# Patient Record
Sex: FEMALE | Race: WHITE
[De-identification: names, ages, dates, MRNs, and addresses within clinical notes are randomized per-mention and may not be internally consistent; named-entity substitution may affect disease eponyms.]

---

## 2017-12-20 ENCOUNTER — HOSPITAL ENCOUNTER (EMERGENCY)
Dept: HOSPITAL 88 - ER | Age: 82
Discharge: HOME | End: 2017-12-20
Payer: COMMERCIAL

## 2017-12-20 VITALS — WEIGHT: 120 LBS | BODY MASS INDEX: 20.49 KG/M2 | HEIGHT: 64 IN

## 2017-12-20 DIAGNOSIS — F03.90: ICD-10-CM

## 2017-12-20 DIAGNOSIS — M54.2: ICD-10-CM

## 2017-12-20 DIAGNOSIS — J15.9: ICD-10-CM

## 2017-12-20 DIAGNOSIS — R05: ICD-10-CM

## 2017-12-20 DIAGNOSIS — R06.09: Primary | ICD-10-CM

## 2017-12-20 DIAGNOSIS — S16.1XXA: ICD-10-CM

## 2017-12-20 LAB
ALBUMIN SERPL-MCNC: 3.3 G/DL (ref 3.5–5)
ALBUMIN/GLOB SERPL: 0.8 {RATIO} (ref 0.8–2)
ALP SERPL-CCNC: 65 IU/L (ref 40–150)
ALT SERPL-CCNC: 21 IU/L (ref 0–55)
ANION GAP SERPL CALC-SCNC: 10.4 MMOL/L (ref 8–16)
BASOPHILS # BLD AUTO: 0 10*3/UL (ref 0–0.1)
BASOPHILS NFR BLD AUTO: 0.1 % (ref 0–1)
BUN SERPL-MCNC: 15 MG/DL (ref 7–26)
BUN/CREAT SERPL: 21 (ref 6–25)
CALCIUM SERPL-MCNC: 9.6 MG/DL (ref 8.4–10.2)
CHLORIDE SERPL-SCNC: 100 MMOL/L (ref 98–107)
CK MB SERPL-MCNC: 1.7 NG/ML (ref 0–5)
CK SERPL-CCNC: 14 IU/L (ref 29–168)
CO2 SERPL-SCNC: 32 MMOL/L (ref 22–29)
DEPRECATED APTT PLAS QN: 33.3 SECONDS (ref 23.8–35.5)
DEPRECATED INR PLAS: 1.02
DEPRECATED NEUTROPHILS # BLD AUTO: 5.7 10*3/UL (ref 2.1–6.9)
EGFRCR SERPLBLD CKD-EPI 2021: > 60 ML/MIN (ref 60–?)
EOSINOPHIL # BLD AUTO: 0 10*3/UL (ref 0–0.4)
EOSINOPHIL NFR BLD AUTO: 0.4 % (ref 0–6)
ERYTHROCYTE [DISTWIDTH] IN CORD BLOOD: 31.8 % (ref 11.7–14.4)
GLOBULIN PLAS-MCNC: 4.3 G/DL (ref 2.3–3.5)
GLUCOSE SERPLBLD-MCNC: 113 MG/DL (ref 74–118)
HCT VFR BLD AUTO: 29.3 % (ref 34.2–44.1)
HGB BLD-MCNC: 9.6 G/DL (ref 12–16)
LYMPHOCYTES # BLD: 1.4 10*3/UL (ref 1–3.2)
LYMPHOCYTES NFR BLD AUTO: 16.7 % (ref 18–39.1)
MCH RBC QN AUTO: 31.7 PG (ref 28–32)
MCHC RBC AUTO-ENTMCNC: 32.8 G/DL (ref 31–35)
MCV RBC AUTO: 96.7 FL (ref 81–99)
MONOCYTES # BLD AUTO: 0.9 10*3/UL (ref 0.2–0.8)
MONOCYTES NFR BLD AUTO: 11.6 % (ref 4.4–11.3)
NEUTS SEG NFR BLD AUTO: 70.8 % (ref 38.7–80)
PLATELET # BLD AUTO: 299 X10E3/UL (ref 140–360)
POTASSIUM SERPL-SCNC: 4.4 MMOL/L (ref 3.5–5.1)
PROTHROMBIN TIME: 13.9 SECONDS (ref 11.9–14.5)
RBC # BLD AUTO: 3.03 X10E6/UL (ref 3.6–5.1)
SODIUM SERPL-SCNC: 138 MMOL/L (ref 136–145)
TROPONIN I SERPL DL<=0.01 NG/ML-MCNC: 0.02 NG/ML (ref 0–0.3)

## 2017-12-20 PROCEDURE — 85025 COMPLETE CBC W/AUTO DIFF WBC: CPT

## 2017-12-20 PROCEDURE — 85730 THROMBOPLASTIN TIME PARTIAL: CPT

## 2017-12-20 PROCEDURE — 80053 COMPREHEN METABOLIC PANEL: CPT

## 2017-12-20 PROCEDURE — 71010: CPT

## 2017-12-20 PROCEDURE — 87400 INFLUENZA A/B EACH AG IA: CPT

## 2017-12-20 PROCEDURE — 82553 CREATINE MB FRACTION: CPT

## 2017-12-20 PROCEDURE — 99284 EMERGENCY DEPT VISIT MOD MDM: CPT

## 2017-12-20 PROCEDURE — 85610 PROTHROMBIN TIME: CPT

## 2017-12-20 PROCEDURE — 84484 ASSAY OF TROPONIN QUANT: CPT

## 2017-12-20 PROCEDURE — 36415 COLL VENOUS BLD VENIPUNCTURE: CPT

## 2017-12-20 PROCEDURE — 87040 BLOOD CULTURE FOR BACTERIA: CPT

## 2017-12-20 PROCEDURE — 83880 ASSAY OF NATRIURETIC PEPTIDE: CPT

## 2017-12-20 PROCEDURE — 93005 ELECTROCARDIOGRAM TRACING: CPT

## 2017-12-20 PROCEDURE — 82550 ASSAY OF CK (CPK): CPT

## 2017-12-20 NOTE — DIAGNOSTIC IMAGING REPORT
PROCEDURE:CHEST SINGLE (PORTABLE)

TECHNIQUE:Portable AP chest

INDICATION:Cough with stiff neck.

COMPARISON:Patients [facility], , CHEST SINGLE (PORTABLE), 

6/12/2017, 15:32.

 

FINDINGS:

Bilateral interstitial opacities superimposed upon chronic interstitial 

lung disease and scar. Normal heart size. Persistent bilateral 

pulmonary artery enlargement. Grossly intact skeleton.

 

 

CONCLUSION:

Bilateral interstitial opacities suspicious for pneumonia superimposed 

upon emphysema, chronic interstitial lung disease and scar.

 

 

 

Dictated by:  Robert Jordan M.D. on 12/20/2017 at 10:39     

Electronically approved by:  Robert Jordan M.D. on 12/20/2017 at 

10:39

## 2017-12-28 ENCOUNTER — HOSPITAL ENCOUNTER (EMERGENCY)
Dept: HOSPITAL 88 - ER | Age: 82
Discharge: HOME | End: 2017-12-28
Payer: MEDICARE

## 2017-12-28 VITALS — HEIGHT: 64 IN | BODY MASS INDEX: 20.49 KG/M2 | WEIGHT: 120 LBS

## 2017-12-28 VITALS — DIASTOLIC BLOOD PRESSURE: 74 MMHG | SYSTOLIC BLOOD PRESSURE: 126 MMHG
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
## 2017-12-28 DIAGNOSIS — S20.212A: Primary | ICD-10-CM

## 2017-12-28 DIAGNOSIS — S40.021A: ICD-10-CM
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
## 2017-12-28 DIAGNOSIS — S70.02XA: ICD-10-CM

## 2017-12-28 DIAGNOSIS — S70.12XA: ICD-10-CM

## 2017-12-28 DIAGNOSIS — Z87.01: ICD-10-CM

## 2017-12-28 LAB
ALBUMIN SERPL-MCNC: 3.1 G/DL (ref 3.5–5)
ALBUMIN/GLOB SERPL: 0.7 {RATIO} (ref 0.8–2)
ALP SERPL-CCNC: 70 IU/L (ref 40–150)
ALT SERPL-CCNC: 28 IU/L (ref 0–55)
ANION GAP SERPL CALC-SCNC: 13.3 MMOL/L (ref 8–16)
BASOPHILS # BLD AUTO: 0 10*3/UL (ref 0–0.1)
BASOPHILS NFR BLD AUTO: 0.2 % (ref 0–1)
BUN SERPL-MCNC: 19 MG/DL (ref 7–26)
BUN/CREAT SERPL: 29 (ref 6–25)
CALCIUM SERPL-MCNC: 9.6 MG/DL (ref 8.4–10.2)
CHLORIDE SERPL-SCNC: 102 MMOL/L (ref 98–107)
CO2 SERPL-SCNC: 28 MMOL/L (ref 22–29)
DEPRECATED NEUTROPHILS # BLD AUTO: 7.8 10*3/UL (ref 2.1–6.9)
EGFRCR SERPLBLD CKD-EPI 2021: > 60 ML/MIN (ref 60–?)
EOSINOPHIL # BLD AUTO: 0 10*3/UL (ref 0–0.4)
EOSINOPHIL NFR BLD AUTO: 0.2 % (ref 0–6)
ERYTHROCYTE [DISTWIDTH] IN CORD BLOOD: (no result) % (ref 11.7–14.4)
GLOBULIN PLAS-MCNC: 4.3 G/DL (ref 2.3–3.5)
GLUCOSE SERPLBLD-MCNC: 111 MG/DL (ref 74–118)
HCT VFR BLD AUTO: 30.6 % (ref 34.2–44.1)
HGB BLD-MCNC: 9.9 G/DL (ref 12–16)
LYMPHOCYTES # BLD: 1.3 10*3/UL (ref 1–3.2)
LYMPHOCYTES NFR BLD AUTO: 12.8 % (ref 18–39.1)
MCH RBC QN AUTO: 31.1 PG (ref 28–32)
MCHC RBC AUTO-ENTMCNC: 32.4 G/DL (ref 31–35)
MCV RBC AUTO: 96.2 FL (ref 81–99)
MONOCYTES # BLD AUTO: 1.1 10*3/UL (ref 0.2–0.8)
MONOCYTES NFR BLD AUTO: 10.8 % (ref 4.4–11.3)
NEUTS SEG NFR BLD AUTO: 75.4 % (ref 38.7–80)
PLATELET # BLD AUTO: 278 X10E3/UL (ref 140–360)
POTASSIUM SERPL-SCNC: 4.3 MMOL/L (ref 3.5–5.1)
RBC # BLD AUTO: 3.18 X10E6/UL (ref 3.6–5.1)
SODIUM SERPL-SCNC: 139 MMOL/L (ref 136–145)

## 2017-12-28 PROCEDURE — 99283 EMERGENCY DEPT VISIT LOW MDM: CPT

## 2017-12-28 PROCEDURE — 72072 X-RAY EXAM THORAC SPINE 3VWS: CPT

## 2017-12-28 PROCEDURE — 71020: CPT

## 2017-12-28 PROCEDURE — 85025 COMPLETE CBC W/AUTO DIFF WBC: CPT

## 2017-12-28 PROCEDURE — 80053 COMPREHEN METABOLIC PANEL: CPT

## 2017-12-28 PROCEDURE — 36415 COLL VENOUS BLD VENIPUNCTURE: CPT

## 2017-12-28 NOTE — DIAGNOSTIC IMAGING REPORT
PROCEDURE:HIP LEFT 2-3 VW (+/- PELVIS)

 

COMPARISON:None.

 

INDICATIONS:FALL

 

FINDINGS:

Postoperative changes of left hip total arthroplasty with the 

acetabular and femoral stem components in proper position. No native 

bone or hardware fracture is identified. Minimal heterotopic 

ossification is present. Normal mineralization. No acute fracture or 

dislocation. Joint spaces are within normal limits.

The soft tissues are unremarkable. Atherosclerotic calcifications.

 

CONCLUSION: 

Left total hip arthroplasty. No acute fracture. 

 

Dictated by:  Manny Kim M.D. on 12/28/2017 at 9:33     

Electronically approved by:  Manny Kim M.D. on 12/28/2017 at 9:33

## 2017-12-28 NOTE — DIAGNOSTIC IMAGING REPORT
PROCEDURE:X-RAY RIGHT HUMERUS, TWO OR MORE VIEWS

 

COMPARISON:None.

 

INDICATIONS:FALL

 

FINDINGS:

There are no fractures, dislocations, lytic or blastic lesions. The 

bones are well-mineralized. The soft-tissues are unremarkable.

 

CONCLUSION:

No acute radiographic abnormality.

 

Dictated by:  Mnany Kim M.D. on 12/28/2017 at 9:37     

Electronically approved by:  aMnny Kim M.D. on 12/28/2017 at 9:37

## 2017-12-28 NOTE — DIAGNOSTIC IMAGING REPORT
PROCEDURE:

Frontal and lateral views of the chest.

 

COMPARISON: Portable chest 12/20/2017. Portable chest 6/12/2017

 

INDICATIONS:   FALL

     

FINDINGS:

Lines/tubes:  None.

 

Lungs: No parenchymal mass. Bilateral chronic parenchymal changes are 

stable. Bibasilar atelectasis.

 

Pleura:  There is no pleural effusion or pneumothorax.

 

Heart and mediastinum:  The heart and the mediastinum are normal. 

Atherosclerotic calcifications.

 

Bones:  No acute bony abnormality. Degenerative changes of the thoracic 

spine. Kyphoplasty changes.

 

IMPRESSION: 

 

No acute radiographic abnormality.

 

Dictated by:  Manny Kim M.D. on 12/28/2017 at 9:27     

Electronically approved by:  Manny Kim M.D. on 12/28/2017 at 9:27

## 2018-04-16 ENCOUNTER — HOSPITAL ENCOUNTER (EMERGENCY)
Dept: HOSPITAL 88 - ER | Age: 83
Discharge: HOME | End: 2018-04-16
Payer: MEDICARE

## 2018-04-16 VITALS — BODY MASS INDEX: 20.49 KG/M2 | HEIGHT: 64 IN | WEIGHT: 120 LBS

## 2018-04-16 VITALS — SYSTOLIC BLOOD PRESSURE: 116 MMHG | DIASTOLIC BLOOD PRESSURE: 80 MMHG

## 2018-04-16 DIAGNOSIS — M25.552: ICD-10-CM

## 2018-04-16 DIAGNOSIS — F41.1: ICD-10-CM

## 2018-04-16 DIAGNOSIS — S93.491A: Primary | ICD-10-CM

## 2018-04-16 DIAGNOSIS — T78.40XA: ICD-10-CM

## 2018-04-16 LAB
ALBUMIN SERPL-MCNC: 3.2 G/DL (ref 3.5–5)
ALBUMIN/GLOB SERPL: 0.7 {RATIO} (ref 0.8–2)
ALP SERPL-CCNC: 63 IU/L (ref 40–150)
ALT SERPL-CCNC: 23 IU/L (ref 0–55)
ANION GAP SERPL CALC-SCNC: 11.9 MMOL/L (ref 8–16)
ANISOCYTOSIS BLD QL SMEAR: (no result)
BACTERIA URNS QL MICRO: (no result) /HPF
BASOPHILS # BLD AUTO: 0 10*3/UL (ref 0–0.1)
BASOPHILS NFR BLD AUTO: 0.1 % (ref 0–1)
BILIRUB UR QL: (no result)
BUN SERPL-MCNC: 16 MG/DL (ref 7–26)
BUN/CREAT SERPL: 24 (ref 6–25)
CALCIUM SERPL-MCNC: 9.6 MG/DL (ref 8.4–10.2)
CHLORIDE SERPL-SCNC: 94 MMOL/L (ref 98–107)
CLARITY UR: CLEAR
CO2 SERPL-SCNC: 31 MMOL/L (ref 22–29)
COLOR UR: (no result)
DEPRECATED NEUTROPHILS # BLD AUTO: 8.3 10*3/UL (ref 2.1–6.9)
DEPRECATED RBC URNS MANUAL-ACNC: (no result) /HPF (ref 0–5)
EGFRCR SERPLBLD CKD-EPI 2021: > 60 ML/MIN (ref 60–?)
EOSINOPHIL # BLD AUTO: 0 10*3/UL (ref 0–0.4)
EOSINOPHIL NFR BLD AUTO: 0.1 % (ref 0–6)
EPI CELLS URNS QL MICRO: (no result) /LPF
ERYTHROCYTE [DISTWIDTH] IN CORD BLOOD: (no result) % (ref 11.7–14.4)
GLOBULIN PLAS-MCNC: 4.4 G/DL (ref 2.3–3.5)
GLUCOSE SERPLBLD-MCNC: 115 MG/DL (ref 74–118)
HCT VFR BLD AUTO: 29.6 % (ref 34.2–44.1)
HGB BLD-MCNC: 10 G/DL (ref 12–16)
HOWELL-JOLLY BOD BLD QL SMEAR: (no result)
HYALINE CASTS #/AREA URNS LPF: (no result) /[LPF] (ref 0–1)
HYPOCHROMIA BLD QL SMEAR: SLIGHT
KETONES UR QL STRIP.AUTO: (no result)
LEUKOCYTE ESTERASE UR QL STRIP.AUTO: NEGATIVE
LYMPHOCYTES # BLD: 0.9 10*3/UL (ref 1–3.2)
LYMPHOCYTES NFR BLD AUTO: 8.6 % (ref 18–39.1)
MCH RBC QN AUTO: 32.5 PG (ref 28–32)
MCHC RBC AUTO-ENTMCNC: 33.8 G/DL (ref 31–35)
MCV RBC AUTO: 96.1 FL (ref 81–99)
MONOCYTES # BLD AUTO: 0.7 10*3/UL (ref 0.2–0.8)
MONOCYTES NFR BLD AUTO: 7.2 % (ref 4.4–11.3)
NEUTS SEG NFR BLD AUTO: 83.5 % (ref 38.7–80)
NITRITE UR QL STRIP.AUTO: NEGATIVE
PLAT MORPH BLD: NORMAL
PLATELET # BLD AUTO: 265 X10E3/UL (ref 140–360)
PLATELET # BLD EST: ADEQUATE 10*3/UL
POIKILOCYTOSIS BLD QL SMEAR: SLIGHT
POTASSIUM SERPL-SCNC: 3.9 MMOL/L (ref 3.5–5.1)
PROT UR QL STRIP.AUTO: (no result)
RBC # BLD AUTO: 3.08 X10E6/UL (ref 3.6–5.1)
RBC MORPH BLD: (no result)
SODIUM SERPL-SCNC: 133 MMOL/L (ref 136–145)
SP GR UR STRIP: 1.02 (ref 1.01–1.02)
STOMATOCYTES BLD QL SMEAR: SLIGHT
TARGETS BLD QL SMEAR: (no result)
UROBILINOGEN UR STRIP-MCNC: 1 MG/DL (ref 0.2–1)
WBC #/AREA URNS HPF: (no result) /HPF (ref 0–5)

## 2018-04-16 PROCEDURE — 72170 X-RAY EXAM OF PELVIS: CPT

## 2018-04-16 PROCEDURE — 84550 ASSAY OF BLOOD/URIC ACID: CPT

## 2018-04-16 PROCEDURE — 94640 AIRWAY INHALATION TREATMENT: CPT

## 2018-04-16 PROCEDURE — 71045 X-RAY EXAM CHEST 1 VIEW: CPT

## 2018-04-16 PROCEDURE — 99284 EMERGENCY DEPT VISIT MOD MDM: CPT

## 2018-04-16 PROCEDURE — 80053 COMPREHEN METABOLIC PANEL: CPT

## 2018-04-16 PROCEDURE — 81001 URINALYSIS AUTO W/SCOPE: CPT

## 2018-04-16 PROCEDURE — 85025 COMPLETE CBC W/AUTO DIFF WBC: CPT

## 2018-04-16 PROCEDURE — 36415 COLL VENOUS BLD VENIPUNCTURE: CPT

## 2018-04-16 PROCEDURE — 93005 ELECTROCARDIOGRAM TRACING: CPT

## 2018-04-16 NOTE — DIAGNOSTIC IMAGING REPORT
PROCEDURE: CHEST SINGLE (PORTABLE)

COMPARISON: 12/28/2017.

INDICATIONS: UNABLE TO STAND/WALK

 

FINDINGS:



The lungs remain well-inflated. No new consolidation, pleural effusion, 

or pneumothorax. Coarse pulmonary interstitial prominence is unchanged 

compared to 12/28/2017.

 

Stable cardiomediastinal contour with tortuosity of the thoracic aorta. 

Normal heart size.

 

Vertebroplasty cement within an upper thoracic vertebral body. No acute 

osseous abnormality. Healed fractures of the posterior left sixth and 

seventh ribs.

 

CONCLUSION:

No acute cardiopulmonary abnormality.

 

Stable presumed age-related fibrotic changes of the lungs. 

 

Dictated by:  Kota Colon M.D. on 4/16/2018 at 9:14     

Electronically approved by:  Kota Colon M.D. on 4/16/2018 at 9:14

## 2018-04-16 NOTE — XMS REPORT
Patient Summary Document

 Created on: 2018



ELISEO DUNLAP

External Reference #: [id number]

: 1931

Sex: Female



Demographics







 [place] [address]

 

 Home Phone  [phone]

 

 Preferred Language  Unknown

 

 Marital Status  Unknown

 

 Mandaen Affiliation  Unknown

 

 Race  Unknown

 

 Additional Race(s)  

 

 Ethnic Group  Unknown





Author







 Author  [facility]

 

 Address  Unknown

 

 Phone  Unavailable







Care Team Providers







 Care Team Member Name  Role  Phone

 

 CRYSTAL RENAE  Unavailable  Unavailable

 

 KASEY MANUEL  Unavailable  Unavailable







Problems

This patient has no known problems.



Allergies, Adverse Reactions, Alerts

This patient has no known allergies or adverse reactions.



Medications

This patient has no known medications.



Results







 Test Description  Test Time  Test Comments  Text Results  Atomic Results  
Result Comments









 CHEST 2 VIEWS            [place] [address]      Patient Name: ELISEO DUNLAP   
MR #: [id number]    : 1931 Age/Sex: 86/F  Acct #: [id number] Req #: 
[id number]  Adm Physician:     Ordered by: CRYSTAL RENAE MD  Report #: [id number]
[id number]   Location: ER  Room/Bed:     _____________________________________________
______________________________________________________    Procedure: [id number] 
DX/CHEST 2 VIEWS  Exam Date: 17                            Exam Time: 
0850       REPORT STATUS: Signed    PROCEDURE:   Frontal and lateral views of 
the chest.       COMPARISON: Portable chest 2017. Portable chest 2017       INDICATIONS:   FALL           FINDINGS:   Lines/tubes:  None.       
Lungs: No parenchymal mass. Bilateral chronic parenchymal changes are    
stable. Bibasilar atelectasis.       Pleura:  There is no pleural effusion or 
pneumothorax.       Heart and mediastinum:  The heart and the mediastinum are 
normal.    Atherosclerotic calcifications.       Bones:  No acute bony 
abnormality. Degenerative changes of the thoracic    spine. Kyphoplasty 
changes.       IMPRESSION:        No acute radiographic abnormality.       
Dictated by:  Dusty Palomino M.D. on 2017 at 9:27        Electronically 
approved by:  Dusty Palomino M.D. on 2017 at 9:27                Dictated By
: DUSTY PALOMINO MD  Electronically Signed By: DUSTY PALOMINO MD on 17  
Transcribed By: ALLEN on 17       COPY TO:   CRYSTAL RENAE MD        
   

 

 HIP LEFT 2-3 VW (+/- PELVIS)            [place] [address]      Patient Name: 
ELISEO DUNLAP   MR #: [id number]    : 1931 Age/Sex: 86/F  Acct #: 
[id number] Req #: [id number]  Adm Physician:     Ordered by: CRYSTAL RENAE MD
  Report #: [id number]   Location: ER  Room/Bed:     ____________________________
_______________________________________________________________________    
Procedure: [id number] DX/HIP LEFT 2-3 VW (+/- PELVIS)  Exam Date:               
              Exam Time:        REPORT STATUS: Signed    PROCEDURE:   HIP LEFT 2
-3 VW (+/- PELVIS)       COMPARISON:   None.       INDICATIONS:   FALL       
FINDINGS:      Postoperative changes of left hip total arthroplasty with the    
acetabular and femoral stem components in proper position. No native    bone or 
hardware fracture is identified. Minimal heterotopic    ossification is 
present. Normal mineralization. No acute fracture or    dislocation. Joint 
spaces are within normal limits.   The soft tissues are unremarkable. 
Atherosclerotic calcifications.       CONCLUSION:       Left total hip 
arthroplasty. No acute fracture.        Dictated by:  Dusty Palomino M.D. on  at 9:33        Electronically approved by:  Dusty Palomino M.D. on 2017 at 9:33                Dictated By: DUSTY PALOMINO MD  Electronically Signed 
By: DUSTY PALOMINO MD on 17  Transcribed By: ALLEN on 17    
   COPY TO:   CRYSTAL RENAE MD           

 

 HUMERUS RIGHT 2+VIEWS            [place] [address]      Patient Name: ELISEO DUNLAP   MR #: [id number]    : 1931 Age/Sex: 86/F  Acct #: [id number] Req 
#: [id number]  Adm Physician:     Ordered by: CRYSTAL RNEAE MD  Report #: [id number]
[id number]   Location: ER  Room/Bed:     _____________________________________________
______________________________________________________    Procedure: [id number] 
DX/HUMERUS RIGHT 2+VIEWS  Exam Date: 17                            Exam 
Time: 0850       REPORT STATUS: Signed    PROCEDURE:   X-RAY RIGHT HUMERUS, TWO 
OR MORE VIEWS       COMPARISON:   None.       INDICATIONS:   FALL       FINDINGS
:      There are no fractures, dislocations, lytic or blastic lesions. The    
bones are well-mineralized. The soft-tissues are unremarkable.          
CONCLUSION:      No acute radiographic abnormality.       Dictated by:  Dusty Palomino M.D. on 2017 at 9:37        Electronically approved by:  Dusty Palomino M.D. on 2017 at 9:37                Dictated By: DUSTY PALOMINO MD  
Electronically Signed By: DUSTY PALOMINO MD on 17  Transcribed By: 
ALLEN on 17       COPY TO:   CRYSTAL RENAE MD           

 

 THORACIC SPINE AP LA            [place] [address]      Patient Name: ELISEO DUNLAP   MR #: [id number]    : 1931 Age/Sex: 86/F  Acct #: [id number] Req 
#: [id number]  Adm Physician:     Ordered by: CRYSTAL RENAE MD  Report #: [id number]
[id number]   Location: ER  Room/Bed:     _____________________________________________
______________________________________________________    Procedure: [id number] 
DX/THORACIC SPINE AP LA  Exam Date: 17                            Exam 
Time: 0850       REPORT STATUS: Signed    PROCEDURE:   X-RAY THORACIC SPINE, 
TWO VIEWS       COMPARISON:   None.       INDICATIONS:   FALL       FINDINGS:  
    The vertebral body and disc space heights are well-maintained. There is    
good spinal alignment. There are no acute displaced fractures, lytic or    
blastic lesions. Degenerative changes evidenced by anterior    osteophytosis at 
multiple levels. Kyphoplasty changes.       CONCLUSION:      No acute 
radiographic abnormality.       Dictated by:  Dusty Palomino M.D. on 2017 
at 9:38        Electronically approved by:  Dusty Palomino M.D. on 2017 at 9
:38                Dictated By: DUSTY PALOMINO MD  Electronically Signed By: DUSTY PALOMINO MD on 17  Transcribed By: ALLEN on 17       COPY TO
:   CRYSTAL RENAE MD           

 

 CHEST SINGLE ([facility])            [place] [address]      Patient Name: ELISEO DUNLAP   MR #: [id number]    : 1931 Age/Sex: 86/F  Acct #: 
[id number] Req #: [id number]  Adm Physician:     Ordered by: KASEY MANUEL MD  Report #: [id number]   Location: ER  Room/Bed:     __________________________
_________________________________________________________________________    
Procedure: [id number] DX/CHEST SINGLE (PORTABLE)  Exam Date: 17           
                 Exam Time: 1010       REPORT STATUS: Signed    PROCEDURE:   
CHEST SINGLE (PORTABLE)   TECHNIQUE:   Portable AP chest   INDICATION:   Cough 
with stiff neck.   COMPARISON:   Patients [facility], DX, CHEST SINGLE (
PORTABLE),    2017, 15:32.       FINDINGS:   Bilateral interstitial 
opacities superimposed upon chronic interstitial    lung disease and scar. 
Normal heart size. Persistent bilateral    pulmonary artery enlargement. 
Grossly intact skeleton.           CONCLUSION:   Bilateral interstitial 
opacities suspicious for pneumonia superimposed    upon emphysema, chronic 
interstitial lung disease and scar.               Dictated by:  Camilla Jordan M.D. on 2017 at 10:39        Electronically approved by:  Camilla Jordan M.D. on 2017 at    10:39                Dictated By: CAMILLA JORDAN MD  Electronically Signed By: CAMILLA JORDAN MD on 17 1039  
Transcribed By: ALLEN on 17 1039       COPY TO:   KASEY MANUEL MD

## 2018-04-16 NOTE — DIAGNOSTIC IMAGING REPORT
PROCEDURE:X-RAY PELVIS, AP VIEW

 

COMPARISON:12/28/2017.

 

INDICATIONS:Left HIP PAIN

 

FINDINGS:

Post surgical changes related to total left hip replacement with intact 

acetabular cup and femoral stem components. No periprosthetic displaced 

fracture.

 

Degenerative changes of the partially visualized lower lumbar spine and 

right hip. Sacroiliac joints are intact. The sacral foramina appear 

intact superiorly.

 



 

CONCLUSION:

No acute osseous abnormalities. Intact surgical hardware related to 

left hip arthroplasty. 

Dictated by:  Kota Colon M.D. on 4/16/2018 at 12:03     

Electronically approved by:  Koat Colon M.D. on 4/16/2018 at 12:03

## 2018-07-30 LAB
BASOPHILS # BLD AUTO: 0 10*3/UL (ref 0–0.1)
BASOPHILS NFR BLD AUTO: 0.2 % (ref 0–1)
DEPRECATED NEUTROPHILS # BLD AUTO: 3.6 10*3/UL (ref 2.1–6.9)
EOSINOPHIL # BLD AUTO: 0.1 10*3/UL (ref 0–0.4)
EOSINOPHIL NFR BLD AUTO: 0.9 % (ref 0–6)
ERYTHROCYTE [DISTWIDTH] IN CORD BLOOD: 31.9 % (ref 11.7–14.4)
HCT VFR BLD AUTO: 25.2 % (ref 34.2–44.1)
HGB BLD-MCNC: 8.5 G/DL (ref 12–16)
LYMPHOCYTES # BLD: 1.4 10*3/UL (ref 1–3.2)
LYMPHOCYTES NFR BLD AUTO: 25.7 % (ref 18–39.1)
MCH RBC QN AUTO: 32.1 PG (ref 28–32)
MCHC RBC AUTO-ENTMCNC: 33.7 G/DL (ref 31–35)
MCV RBC AUTO: 95.1 FL (ref 81–99)
MONOCYTES # BLD AUTO: 0.3 10*3/UL (ref 0.2–0.8)
MONOCYTES NFR BLD AUTO: 5.8 % (ref 4.4–11.3)
NEUTS SEG NFR BLD AUTO: 67 % (ref 38.7–80)
PLATELET # BLD AUTO: 249 X10E3/UL (ref 140–360)
RBC # BLD AUTO: 2.65 X10E6/UL (ref 3.6–5.1)

## 2018-08-01 ENCOUNTER — HOSPITAL ENCOUNTER (OUTPATIENT)
Dept: HOSPITAL 88 - OR | Age: 83
Discharge: HOME | End: 2018-08-01
Attending: INTERNAL MEDICINE
Payer: MEDICARE

## 2018-08-01 DIAGNOSIS — Z01.812: ICD-10-CM

## 2018-08-01 DIAGNOSIS — K29.70: ICD-10-CM

## 2018-08-01 DIAGNOSIS — I44.4: ICD-10-CM

## 2018-08-01 DIAGNOSIS — K44.9: ICD-10-CM

## 2018-08-01 DIAGNOSIS — K57.30: ICD-10-CM

## 2018-08-01 DIAGNOSIS — K22.70: Primary | ICD-10-CM

## 2018-08-01 DIAGNOSIS — I45.10: ICD-10-CM

## 2018-08-01 DIAGNOSIS — Z80.0: ICD-10-CM

## 2018-08-01 DIAGNOSIS — K21.0: ICD-10-CM

## 2018-08-01 DIAGNOSIS — R63.4: ICD-10-CM

## 2018-08-01 DIAGNOSIS — M81.0: ICD-10-CM

## 2018-08-01 DIAGNOSIS — Z01.810: ICD-10-CM

## 2018-08-01 DIAGNOSIS — Z96.649: ICD-10-CM

## 2018-08-01 DIAGNOSIS — R00.1: ICD-10-CM

## 2018-08-01 PROCEDURE — 85025 COMPLETE CBC W/AUTO DIFF WBC: CPT

## 2018-08-01 PROCEDURE — 36415 COLL VENOUS BLD VENIPUNCTURE: CPT

## 2018-08-01 PROCEDURE — 88305 TISSUE EXAM BY PATHOLOGIST: CPT

## 2018-08-01 PROCEDURE — 43239 EGD BIOPSY SINGLE/MULTIPLE: CPT

## 2018-08-01 PROCEDURE — 88312 SPECIAL STAINS GROUP 1: CPT

## 2018-08-01 PROCEDURE — 93005 ELECTROCARDIOGRAM TRACING: CPT

## 2018-11-22 ENCOUNTER — HOSPITAL ENCOUNTER (INPATIENT)
Dept: HOSPITAL 88 - FSED | Age: 83
LOS: 11 days | Discharge: HOME | DRG: 853 | End: 2018-12-03
Attending: INTERNAL MEDICINE | Admitting: INTERNAL MEDICINE
Payer: MEDICARE

## 2018-11-22 VITALS — SYSTOLIC BLOOD PRESSURE: 108 MMHG | DIASTOLIC BLOOD PRESSURE: 58 MMHG

## 2018-11-22 VITALS — DIASTOLIC BLOOD PRESSURE: 58 MMHG | SYSTOLIC BLOOD PRESSURE: 108 MMHG

## 2018-11-22 VITALS — HEIGHT: 64 IN | WEIGHT: 119.56 LBS | BODY MASS INDEX: 20.41 KG/M2

## 2018-11-22 DIAGNOSIS — K29.70: ICD-10-CM

## 2018-11-22 DIAGNOSIS — R13.19: ICD-10-CM

## 2018-11-22 DIAGNOSIS — Z88.5: ICD-10-CM

## 2018-11-22 DIAGNOSIS — M10.9: ICD-10-CM

## 2018-11-22 DIAGNOSIS — I95.9: ICD-10-CM

## 2018-11-22 DIAGNOSIS — J18.9: ICD-10-CM

## 2018-11-22 DIAGNOSIS — A41.9: Primary | ICD-10-CM

## 2018-11-22 DIAGNOSIS — K21.0: ICD-10-CM

## 2018-11-22 DIAGNOSIS — Z88.8: ICD-10-CM

## 2018-11-22 DIAGNOSIS — D64.9: ICD-10-CM

## 2018-11-22 DIAGNOSIS — I10: ICD-10-CM

## 2018-11-22 DIAGNOSIS — K44.9: ICD-10-CM

## 2018-11-22 DIAGNOSIS — F41.9: ICD-10-CM

## 2018-11-22 LAB
CK MB SERPL-MCNC: 2.6 NG/ML (ref 0–5)
CK SERPL-CCNC: 122 IU/L (ref 29–168)

## 2018-11-22 PROCEDURE — 87335 E COLI 0157 AG IA: CPT

## 2018-11-22 PROCEDURE — 88312 SPECIAL STAINS GROUP 1: CPT

## 2018-11-22 PROCEDURE — 86850 RBC ANTIBODY SCREEN: CPT

## 2018-11-22 PROCEDURE — 71046 X-RAY EXAM CHEST 2 VIEWS: CPT

## 2018-11-22 PROCEDURE — 87206 SMEAR FLUORESCENT/ACID STAI: CPT

## 2018-11-22 PROCEDURE — 82553 CREATINE MB FRACTION: CPT

## 2018-11-22 PROCEDURE — 87070 CULTURE OTHR SPECIMN AEROBIC: CPT

## 2018-11-22 PROCEDURE — 93005 ELECTROCARDIOGRAM TRACING: CPT

## 2018-11-22 PROCEDURE — 83880 ASSAY OF NATRIURETIC PEPTIDE: CPT

## 2018-11-22 PROCEDURE — 31625 BRONCHOSCOPY W/BIOPSY(S): CPT

## 2018-11-22 PROCEDURE — 87102 FUNGUS ISOLATION CULTURE: CPT

## 2018-11-22 PROCEDURE — 97139 UNLISTED THERAPEUTIC PX: CPT

## 2018-11-22 PROCEDURE — 87040 BLOOD CULTURE FOR BACTERIA: CPT

## 2018-11-22 PROCEDURE — 80076 HEPATIC FUNCTION PANEL: CPT

## 2018-11-22 PROCEDURE — 87205 SMEAR GRAM STAIN: CPT

## 2018-11-22 PROCEDURE — 82550 ASSAY OF CK (CPK): CPT

## 2018-11-22 PROCEDURE — 94640 AIRWAY INHALATION TREATMENT: CPT

## 2018-11-22 PROCEDURE — 71250 CT THORAX DX C-: CPT

## 2018-11-22 PROCEDURE — 80048 BASIC METABOLIC PNL TOTAL CA: CPT

## 2018-11-22 PROCEDURE — 81003 URINALYSIS AUTO W/O SCOPE: CPT

## 2018-11-22 PROCEDURE — 88305 TISSUE EXAM BY PATHOLOGIST: CPT

## 2018-11-22 PROCEDURE — 87400 INFLUENZA A/B EACH AG IA: CPT

## 2018-11-22 PROCEDURE — 74230 X-RAY XM SWLNG FUNCJ C+: CPT

## 2018-11-22 PROCEDURE — 36415 COLL VENOUS BLD VENIPUNCTURE: CPT

## 2018-11-22 PROCEDURE — 94668 MNPJ CHEST WALL SBSQ: CPT

## 2018-11-22 PROCEDURE — 74220 X-RAY XM ESOPHAGUS 1CNTRST: CPT

## 2018-11-22 PROCEDURE — 99284 EMERGENCY DEPT VISIT MOD MDM: CPT

## 2018-11-22 PROCEDURE — 31623 DX BRONCHOSCOPE/BRUSH: CPT

## 2018-11-22 PROCEDURE — 94667 MNPJ CHEST WALL 1ST: CPT

## 2018-11-22 PROCEDURE — 86920 COMPATIBILITY TEST SPIN: CPT

## 2018-11-22 PROCEDURE — 83605 ASSAY OF LACTIC ACID: CPT

## 2018-11-22 PROCEDURE — 71045 X-RAY EXAM CHEST 1 VIEW: CPT

## 2018-11-22 PROCEDURE — 43239 EGD BIOPSY SINGLE/MULTIPLE: CPT

## 2018-11-22 PROCEDURE — 85025 COMPLETE CBC W/AUTO DIFF WBC: CPT

## 2018-11-22 PROCEDURE — 70490 CT SOFT TISSUE NECK W/O DYE: CPT

## 2018-11-22 PROCEDURE — 83735 ASSAY OF MAGNESIUM: CPT

## 2018-11-22 PROCEDURE — 87116 MYCOBACTERIA CULTURE: CPT

## 2018-11-22 PROCEDURE — 84484 ASSAY OF TROPONIN QUANT: CPT

## 2018-11-22 PROCEDURE — 76001: CPT

## 2018-11-22 PROCEDURE — 86900 BLOOD TYPING SEROLOGIC ABO: CPT

## 2018-11-22 PROCEDURE — 80053 COMPREHEN METABOLIC PANEL: CPT

## 2018-11-22 RX ADMIN — LEVOFLOXACIN SCH MG: 5 INJECTION, SOLUTION INTRAVENOUS at 21:00

## 2018-11-22 RX ADMIN — Medication SCH ML: at 23:40

## 2018-11-22 NOTE — DIAGNOSTIC IMAGING REPORT
EXAMINATION:  CXR 2 VIEW - [facility]    



INDICATION: Cough, cold, fever  

^[id number]

^1950



COMPARISON:  Chest x-ray 12/28/2018 and 4/16/2018

     

FINDINGS:  PA and lateral views



TUBES and LINES:  None.



LUNGS:  Lungs are well inflated.  Patchy bibasilar opacities which are new from

4/16/2018.   There is no evidence of pulmonary edema.



PLEURA:  No pleural effusion or pneumothorax.



HEART AND MEDIASTINUM:  The cardiomediastinal silhouette is unremarkable.    



BONES AND SOFT TISSUES:  No acute osseous lesion. Kyphoplasty cement in T7.

Soft tissues are unremarkable.



UPPER ABDOMEN: No free air under the diaphragm.    



IMPRESSION: 

Bilateral airspace opacities concerning for multifocal pneumonia in the

appropriate clinical setting. Recommend follow-up chest x-ray in 8-12 weeks to

document resolution.





Signed by: DR. Larry Sanchez MD on 11/22/2018 8:33 PM

## 2018-11-23 VITALS — SYSTOLIC BLOOD PRESSURE: 94 MMHG | DIASTOLIC BLOOD PRESSURE: 47 MMHG

## 2018-11-23 VITALS — SYSTOLIC BLOOD PRESSURE: 96 MMHG | DIASTOLIC BLOOD PRESSURE: 51 MMHG

## 2018-11-23 VITALS — SYSTOLIC BLOOD PRESSURE: 90 MMHG | DIASTOLIC BLOOD PRESSURE: 52 MMHG

## 2018-11-23 VITALS — SYSTOLIC BLOOD PRESSURE: 96 MMHG | DIASTOLIC BLOOD PRESSURE: 20 MMHG

## 2018-11-23 VITALS — DIASTOLIC BLOOD PRESSURE: 63 MMHG | SYSTOLIC BLOOD PRESSURE: 122 MMHG

## 2018-11-23 VITALS — DIASTOLIC BLOOD PRESSURE: 57 MMHG | SYSTOLIC BLOOD PRESSURE: 103 MMHG

## 2018-11-23 VITALS — DIASTOLIC BLOOD PRESSURE: 50 MMHG | SYSTOLIC BLOOD PRESSURE: 96 MMHG

## 2018-11-23 LAB
ANION GAP SERPL CALC-SCNC: 13 MMOL/L (ref 8–16)
BASOPHILS # BLD AUTO: 0 10*3/UL (ref 0–0.1)
BASOPHILS NFR BLD AUTO: 0.2 % (ref 0–1)
BUN SERPL-MCNC: 17 MG/DL (ref 7–26)
BUN/CREAT SERPL: 27 (ref 6–25)
CALCIUM SERPL-MCNC: 8.5 MG/DL (ref 8.4–10.2)
CHLORIDE SERPL-SCNC: 96 MMOL/L (ref 98–107)
CK MB SERPL-MCNC: 2.4 NG/ML (ref 0–5)
CK MB SERPL-MCNC: 2.7 NG/ML (ref 0–5)
CK SERPL-CCNC: 193 IU/L (ref 29–168)
CK SERPL-CCNC: 220 IU/L (ref 29–168)
CO2 SERPL-SCNC: 28 MMOL/L (ref 22–29)
DEPRECATED NEUTROPHILS # BLD AUTO: 3.7 10*3/UL (ref 2.1–6.9)
EGFRCR SERPLBLD CKD-EPI 2021: > 60 ML/MIN (ref 60–?)
EOSINOPHIL # BLD AUTO: 0 10*3/UL (ref 0–0.4)
EOSINOPHIL NFR BLD AUTO: 0.2 % (ref 0–6)
EOSINOPHIL NFR BLD MANUAL: 1 % (ref 0–7)
ERYTHROCYTE [DISTWIDTH] IN CORD BLOOD: 32.3 % (ref 11.7–14.4)
GLUCOSE SERPLBLD-MCNC: 105 MG/DL (ref 74–118)
HCT VFR BLD AUTO: 20 % (ref 34.2–44.1)
HGB BLD-MCNC: 6.7 G/DL (ref 12–16)
LYMPHOCYTES # BLD: 0.6 10*3/UL (ref 1–3.2)
LYMPHOCYTES NFR BLD AUTO: 12.1 % (ref 18–39.1)
LYMPHOCYTES NFR BLD MANUAL: 18 % (ref 19–48)
MCH RBC QN AUTO: 31.9 PG (ref 28–32)
MCHC RBC AUTO-ENTMCNC: 33.5 G/DL (ref 31–35)
MCV RBC AUTO: 95.2 FL (ref 81–99)
MONOCYTES # BLD AUTO: 0.4 10*3/UL (ref 0.2–0.8)
MONOCYTES NFR BLD AUTO: 7.5 % (ref 4.4–11.3)
MONOCYTES NFR BLD MANUAL: 6 % (ref 3.4–9)
NEUTS BAND NFR BLD MANUAL: 9 %
NEUTS SEG NFR BLD AUTO: 79.6 % (ref 38.7–80)
NEUTS SEG NFR BLD MANUAL: 66 % (ref 40–74)
PLAT MORPH BLD: NORMAL
PLATELET # BLD AUTO: 176 X10E3/UL (ref 140–360)
PLATELET # BLD EST: ADEQUATE 10*3/UL
POTASSIUM SERPL-SCNC: 4 MMOL/L (ref 3.5–5.1)
RBC # BLD AUTO: 2.1 X10E6/UL (ref 3.6–5.1)
RBC MORPH BLD: NORMAL
SODIUM SERPL-SCNC: 133 MMOL/L (ref 136–145)

## 2018-11-23 PROCEDURE — 30233N1 TRANSFUSION OF NONAUTOLOGOUS RED BLOOD CELLS INTO PERIPHERAL VEIN, PERCUTANEOUS APPROACH: ICD-10-PCS | Performed by: INTERNAL MEDICINE

## 2018-11-23 RX ADMIN — Medication SCH ML: at 15:21

## 2018-11-23 RX ADMIN — TAZOBACTAM SODIUM AND PIPERACILLIN SODIUM SCH MLS/HR: 375; 3 INJECTION, SOLUTION INTRAVENOUS at 07:52

## 2018-11-23 RX ADMIN — Medication SCH ML: at 03:10

## 2018-11-23 RX ADMIN — TAZOBACTAM SODIUM AND PIPERACILLIN SODIUM SCH MLS/HR: 375; 3 INJECTION, SOLUTION INTRAVENOUS at 23:58

## 2018-11-23 RX ADMIN — Medication SCH ML: at 07:50

## 2018-11-23 RX ADMIN — LEVOFLOXACIN SCH MG: 5 INJECTION, SOLUTION INTRAVENOUS at 21:54

## 2018-11-23 RX ADMIN — TAZOBACTAM SODIUM AND PIPERACILLIN SODIUM SCH MLS/HR: 375; 3 INJECTION, SOLUTION INTRAVENOUS at 16:22

## 2018-11-23 RX ADMIN — GUAIFENESIN PRN MG: 100 SOLUTION ORAL at 15:08

## 2018-11-23 RX ADMIN — PANTOPRAZOLE SODIUM SCH MG: 40 TABLET, DELAYED RELEASE ORAL at 08:30

## 2018-11-23 RX ADMIN — Medication PRN MG: at 17:38

## 2018-11-23 RX ADMIN — Medication SCH ML: at 12:56

## 2018-11-23 RX ADMIN — Medication SCH ML: at 19:30

## 2018-11-23 RX ADMIN — GUAIFENESIN PRN MG: 100 SOLUTION ORAL at 08:30

## 2018-11-23 NOTE — DIAGNOSTIC IMAGING REPORT
PROCEDURE:

BARIUM SWALLOW was performed with Sodium Carbonate and Barium. 

Following completion of the double contrast barium swallow and 

esophagram a Modified Barium Swallow was performed by the speech 

therapist. 

 

INDICATIONS: Dysphasia with pneumonia

 

FINDINGS:

Swallow:  The swallowing mechanism was grossly normal. The esophagus 

was normally distensible and the mucosa was within normal limits.  

Esophageal motility was within normal limits.

 

Gastroesophageal junction:  There is no evidence of hiatal hernia.

 

Modified barium swallow: There is no evidence of aspiration. Prominent 

cricopharyngeus muscle impression on the posterior esophagus.

 

Incidental findings: Lateral view of the cervical spine reveals 

subluxation anteriorly of C3 on C4.

 

Fluoroscopy time: 2.1 minutes

Total dose: 15.96 mGy

 

IMPRESSION:

 

1. Normal barium swallow.

2. Please see the full report of a modified barium swallow provided by 

the speech pathologist.

3. Prominent cricopharyngeus muscle impression on the posterior 

cervical esophagus.

 

 

 

Jonnathan Ellis D.O.  

Dictated by:  Jonnathan Ellis D.O. on 11/23/2018 at 13:47     

Electronically approved by:  Jnonathan Ellis D.O. on 11/23/2018 at 13:47

## 2018-11-23 NOTE — HISTORY AND PHYSICAL
REASON FOR ADMISSION:  Pneumonia.



HISTORY OF PRESENT ILLNESS:  This patient is an elderly lady, 87 years old, 

who has been having a lot of trouble with significant dysphagia with solid 

intake, and has now caused her to have a lot of post meals coughing.  She 

started having increasing purulent sputum production where she then 

presented to the emergency room where she was noted to have bilateral 

multifocal pneumonia, worrisome for possible aspiration pneumonia.  She is 

being admitted for further evaluation.



PAST MEDICAL HISTORY:  Significant for chronic abdominal pain.



MEDICATIONS: See MAR.



ALLERGIES:  MORPHINE AND STEROIDS.



SOCIAL HISTORY:  Nonsmoker and nondrinker.  Lives at home with her .



FAMILY HISTORY:  Hypertension. 



PHYSICAL EXAMINATION 

VITALS:  98.1, pulse 90, blood pressure 96/50, sats 95%.

GENERAL:  She appears to be in no apparent distress lying in bed.

LUNGS:  Have decreased breath sounds with mild to moderate rhonchi 

bilaterally.  Good airway movement.

NECK:  Supple.  No lymphadenopathy.

CARDIOVASCULAR:  Regular rate and rhythm.  

ABDOMEN:  Soft.  Good bowel sounds.  Nontender and nondistended.

EXTREMITIES:  No clubbing or cyanosis.

NEUROLOGIC:  Moves all extremities times 4.





ASSESSMENT AND PLAN 

1.  Multifocal pneumonia bilaterally:  Will continue with intravenous 

antibiotics.  Will add Zosyn for possible aspiration pneumonia.

2.  Dysphagia:  Will consult GI to evaluate the significant dysphagia.  

Will make her n.p.o. right now since I am concerned about aspiration 

pneumonia.

3.  Anemia:  Will transfuse 2 units.

4.  Hypotension:  Continue to monitor.  Please see hospital chart for full 

details. 

  









DD:  11/23/2018 06:46

DT:  11/23/2018 06:48

Job#:  [id number]

## 2018-11-23 NOTE — CONSULTATION
DATE OF CONSULTATION:  November 23, 2018 



REASON FOR CONSULTATION:  Dysphagia.



HISTORY OF PRESENT ILLNESS:  Ms. Dial is a pleasant 87-year-old female 

who apparently was admitted because of progressive shortness of breath and 

pneumonia.



She does have a long-standing history of dysphagia.  Has been seen and 

evaluated by my associate, Dr. So.  She apparently had an endoscopy 

done in June or July, as well as a colonoscopy done last year in which also 

there was evidence of a fissure, which apparently required surgery.  

Endoscopy results are not available to me.  I am not certain if the patient 

has been dilated.  She has also apparently has had manometry.  Once again, 

those results are not available.



The patient apparently has been having progressive dysphagia, mainly to 

solids.  Got short of breath and was found to have pneumonia and was 

admitted.  There is some weight loss.  She denies any significant 

dyspepsia, although has chronic nausea.  



PAST MEDICAL HISTORY:  As in [facility].



FAMILY HISTORY:  Noncontributory.



SOCIAL HISTORY:  She apparently does not smoke or drink.



REVIEW OF SYSTEMS:  As in [facility].



ALLERGIES:  PLEASE REFER TO THE CHART.



PHYSICAL EXAMINATION 

VITAL SIGNS:  Were all within normal limits.  

GENERAL:  Fragile, pleasant and elderly female in no distress. 

HEENT:  Unremarkable.

NECK:  Supple.

LUNGS:  Clear. 

HEART:  Regular rate and rhythm.  

ABDOMEN:  Soft but nontender.  Bowel sounds appear to be normal.

RECTAL:  Deferred.



LABORATORY DATA:  On admission, white count was 4000, hemoglobin 6.7, 

hematocrit 20, , and platelet count was 176,000.  Blood chemistry 

revealed grossly normal electrolytes.  BUN and creatinine were normal.  



Chest x-ray on admission revealed multifocal pneumonia.  



ASSESSMENT:  Elderly female with dysphagia admitted with what seems to be 

aspiration pneumonia.  The main question at this point from a 

gastroenterology point of view if having physical restriction that could be 

treated by endoscopy or by means of an esophageal stricture, or if this is 

esophageal dysmotility, which would be more difficult to treat.  

 

PLAN

1.  For now, aggressive antibiotics.  

2.  Agree with transfusion.  There is no overt signs of active GI bleeding 

to warrant an urgent endoscopy at this point.  



3.  Will obtain modified barium swallow.  Depending on that, further 

recommendations regarding endoscopy would be made.  Pneumonia has to be 

stabilized more too.  



 





DD:  11/23/2018 09:56

DT:  11/23/2018 10:05

Job#:  [id number]

## 2018-11-24 VITALS — SYSTOLIC BLOOD PRESSURE: 122 MMHG | DIASTOLIC BLOOD PRESSURE: 78 MMHG

## 2018-11-24 VITALS — SYSTOLIC BLOOD PRESSURE: 109 MMHG | DIASTOLIC BLOOD PRESSURE: 59 MMHG

## 2018-11-24 VITALS — DIASTOLIC BLOOD PRESSURE: 62 MMHG | SYSTOLIC BLOOD PRESSURE: 117 MMHG

## 2018-11-24 VITALS — DIASTOLIC BLOOD PRESSURE: 59 MMHG | SYSTOLIC BLOOD PRESSURE: 109 MMHG

## 2018-11-24 VITALS — DIASTOLIC BLOOD PRESSURE: 55 MMHG | SYSTOLIC BLOOD PRESSURE: 96 MMHG

## 2018-11-24 VITALS — SYSTOLIC BLOOD PRESSURE: 125 MMHG | DIASTOLIC BLOOD PRESSURE: 71 MMHG

## 2018-11-24 VITALS — SYSTOLIC BLOOD PRESSURE: 111 MMHG | DIASTOLIC BLOOD PRESSURE: 56 MMHG

## 2018-11-24 VITALS — SYSTOLIC BLOOD PRESSURE: 105 MMHG | DIASTOLIC BLOOD PRESSURE: 93 MMHG

## 2018-11-24 VITALS — DIASTOLIC BLOOD PRESSURE: 94 MMHG | SYSTOLIC BLOOD PRESSURE: 118 MMHG

## 2018-11-24 LAB
BASOPHILS # BLD AUTO: 0 10*3/UL (ref 0–0.1)
BASOPHILS NFR BLD AUTO: 0.2 % (ref 0–1)
DEPRECATED NEUTROPHILS # BLD AUTO: 3.6 10*3/UL (ref 2.1–6.9)
EOSINOPHIL # BLD AUTO: 0 10*3/UL (ref 0–0.4)
EOSINOPHIL NFR BLD AUTO: 0.5 % (ref 0–6)
ERYTHROCYTE [DISTWIDTH] IN CORD BLOOD: 28.2 % (ref 11.7–14.4)
HCT VFR BLD AUTO: 25.6 % (ref 34.2–44.1)
HGB BLD-MCNC: 8.5 G/DL (ref 12–16)
LYMPHOCYTES # BLD: 1.6 10*3/UL (ref 1–3.2)
LYMPHOCYTES NFR BLD AUTO: 28.2 % (ref 18–39.1)
MCH RBC QN AUTO: 31 PG (ref 28–32)
MCHC RBC AUTO-ENTMCNC: 33.2 G/DL (ref 31–35)
MCV RBC AUTO: 93.4 FL (ref 81–99)
MONOCYTES # BLD AUTO: 0.5 10*3/UL (ref 0.2–0.8)
MONOCYTES NFR BLD AUTO: 9 % (ref 4.4–11.3)
NEUTS SEG NFR BLD AUTO: 61.6 % (ref 38.7–80)
PLATELET # BLD AUTO: 173 X10E3/UL (ref 140–360)
RBC # BLD AUTO: 2.74 X10E6/UL (ref 3.6–5.1)

## 2018-11-24 RX ADMIN — Medication SCH ML: at 15:00

## 2018-11-24 RX ADMIN — Medication SCH ML: at 22:53

## 2018-11-24 RX ADMIN — Medication SCH ML: at 00:10

## 2018-11-24 RX ADMIN — TAZOBACTAM SODIUM AND PIPERACILLIN SODIUM SCH MLS/HR: 375; 3 INJECTION, SOLUTION INTRAVENOUS at 23:49

## 2018-11-24 RX ADMIN — Medication SCH ML: at 19:26

## 2018-11-24 RX ADMIN — PANTOPRAZOLE SODIUM SCH MG: 40 TABLET, DELAYED RELEASE ORAL at 08:35

## 2018-11-24 RX ADMIN — LEVOFLOXACIN SCH MG: 5 INJECTION, SOLUTION INTRAVENOUS at 21:32

## 2018-11-24 RX ADMIN — Medication SCH ML: at 06:30

## 2018-11-24 RX ADMIN — Medication SCH ML: at 11:00

## 2018-11-24 RX ADMIN — TAZOBACTAM SODIUM AND PIPERACILLIN SODIUM SCH MLS/HR: 375; 3 INJECTION, SOLUTION INTRAVENOUS at 08:35

## 2018-11-24 RX ADMIN — GUAIFENESIN PRN MG: 100 SOLUTION ORAL at 19:33

## 2018-11-24 RX ADMIN — TAZOBACTAM SODIUM AND PIPERACILLIN SODIUM SCH MLS/HR: 375; 3 INJECTION, SOLUTION INTRAVENOUS at 16:52

## 2018-11-24 NOTE — PROGRESS NOTE
DATE:  November 24, 2018 



LOCATION:  Room #198.



SUBJECTIVE:  Ms. Dial from a GI point of view is doing well.  She denies 

any worsening difficulty swallowing.



PHYSICAL EXAMINATION

VITAL SIGNS:  Grossly unremarkable.



LABORATORY DATA:  Today, white count was 5000, hemoglobin 8.5, hematocrit 

25, and platelet count was 173.



Barium swallow did reveal prominent cricopharyngeus muscle impression of 

the posterior cervical esophagus.  Otherwise, there is no evidence of any 

esophageal stricture.  There was also no evidence of aspiration apparently.



ASSESSMENT:  Elderly female with dysphagia, probably related to 

cricopharyngeal muscle.



PLAN

1. For now, continue current regimen.

2. We will consider EGD will dilatation once pulmonary status improved.







DD:  11/24/2018 14:17

DT:  11/24/2018 21:35

Job#:  [id number]

## 2018-11-25 VITALS — DIASTOLIC BLOOD PRESSURE: 65 MMHG | SYSTOLIC BLOOD PRESSURE: 108 MMHG

## 2018-11-25 VITALS — DIASTOLIC BLOOD PRESSURE: 99 MMHG | SYSTOLIC BLOOD PRESSURE: 122 MMHG

## 2018-11-25 VITALS — DIASTOLIC BLOOD PRESSURE: 78 MMHG | SYSTOLIC BLOOD PRESSURE: 108 MMHG

## 2018-11-25 VITALS — SYSTOLIC BLOOD PRESSURE: 100 MMHG | DIASTOLIC BLOOD PRESSURE: 54 MMHG

## 2018-11-25 VITALS — SYSTOLIC BLOOD PRESSURE: 115 MMHG | DIASTOLIC BLOOD PRESSURE: 65 MMHG

## 2018-11-25 VITALS — DIASTOLIC BLOOD PRESSURE: 60 MMHG | SYSTOLIC BLOOD PRESSURE: 97 MMHG

## 2018-11-25 VITALS — SYSTOLIC BLOOD PRESSURE: 106 MMHG | DIASTOLIC BLOOD PRESSURE: 61 MMHG

## 2018-11-25 VITALS — SYSTOLIC BLOOD PRESSURE: 110 MMHG | DIASTOLIC BLOOD PRESSURE: 61 MMHG

## 2018-11-25 VITALS — DIASTOLIC BLOOD PRESSURE: 61 MMHG | SYSTOLIC BLOOD PRESSURE: 106 MMHG

## 2018-11-25 VITALS — DIASTOLIC BLOOD PRESSURE: 56 MMHG | SYSTOLIC BLOOD PRESSURE: 102 MMHG

## 2018-11-25 VITALS — SYSTOLIC BLOOD PRESSURE: 97 MMHG | DIASTOLIC BLOOD PRESSURE: 60 MMHG

## 2018-11-25 VITALS — SYSTOLIC BLOOD PRESSURE: 108 MMHG | DIASTOLIC BLOOD PRESSURE: 58 MMHG

## 2018-11-25 LAB
ALBUMIN SERPL-MCNC: 2.7 G/DL (ref 3.5–5)
ALBUMIN/GLOB SERPL: 0.9 {RATIO} (ref 0.8–2)
ALP SERPL-CCNC: 48 IU/L (ref 40–150)
ALT SERPL-CCNC: 16 IU/L (ref 0–55)
ANION GAP SERPL CALC-SCNC: 10.8 MMOL/L (ref 8–16)
ANISOCYTOSIS BLD QL SMEAR: (no result)
BASOPHILS # BLD AUTO: 0 10*3/UL (ref 0–0.1)
BASOPHILS NFR BLD AUTO: 0.2 % (ref 0–1)
BUN SERPL-MCNC: 14 MG/DL (ref 7–26)
BUN/CREAT SERPL: 25 (ref 6–25)
CALCIUM SERPL-MCNC: 8.2 MG/DL (ref 8.4–10.2)
CHLORIDE SERPL-SCNC: 96 MMOL/L (ref 98–107)
CO2 SERPL-SCNC: 31 MMOL/L (ref 22–29)
DEPRECATED NEUTROPHILS # BLD AUTO: 4.7 10*3/UL (ref 2.1–6.9)
EGFRCR SERPLBLD CKD-EPI 2021: > 60 ML/MIN (ref 60–?)
EOSINOPHIL # BLD AUTO: 0 10*3/UL (ref 0–0.4)
EOSINOPHIL NFR BLD AUTO: 0.3 % (ref 0–6)
ERYTHROCYTE [DISTWIDTH] IN CORD BLOOD: 28.7 % (ref 11.7–14.4)
GLOBULIN PLAS-MCNC: 3.1 G/DL (ref 2.3–3.5)
GLUCOSE SERPLBLD-MCNC: 112 MG/DL (ref 74–118)
HCT VFR BLD AUTO: 26.6 % (ref 34.2–44.1)
HGB BLD-MCNC: 9.1 G/DL (ref 12–16)
LYMPHOCYTES # BLD: 0.6 10*3/UL (ref 1–3.2)
LYMPHOCYTES NFR BLD AUTO: 9.8 % (ref 18–39.1)
LYMPHOCYTES NFR BLD MANUAL: 19 % (ref 19–48)
MAGNESIUM SERPL-MCNC: 1.5 MG/DL (ref 1.3–2.1)
MCH RBC QN AUTO: 32.2 PG (ref 28–32)
MCHC RBC AUTO-ENTMCNC: 34.2 G/DL (ref 31–35)
MCV RBC AUTO: 94 FL (ref 81–99)
MONOCYTES # BLD AUTO: 0.7 10*3/UL (ref 0.2–0.8)
MONOCYTES NFR BLD AUTO: 11.5 % (ref 4.4–11.3)
MONOCYTES NFR BLD MANUAL: 11 % (ref 3.4–9)
NEUTS BAND NFR BLD MANUAL: 21 %
NEUTS SEG NFR BLD AUTO: 77.5 % (ref 38.7–80)
NEUTS SEG NFR BLD MANUAL: 49 % (ref 40–74)
PLAT MORPH BLD: NORMAL
PLATELET # BLD AUTO: 171 X10E3/UL (ref 140–360)
PLATELET # BLD EST: ADEQUATE 10*3/UL
POTASSIUM SERPL-SCNC: 3.8 MMOL/L (ref 3.5–5.1)
RBC # BLD AUTO: 2.83 X10E6/UL (ref 3.6–5.1)
SODIUM SERPL-SCNC: 134 MMOL/L (ref 136–145)

## 2018-11-25 RX ADMIN — Medication SCH ML: at 03:00

## 2018-11-25 RX ADMIN — Medication PRN MG: at 14:30

## 2018-11-25 RX ADMIN — Medication SCH ML: at 10:55

## 2018-11-25 RX ADMIN — Medication SCH ML: at 22:49

## 2018-11-25 RX ADMIN — Medication SCH ML: at 06:35

## 2018-11-25 RX ADMIN — Medication SCH ML: at 18:50

## 2018-11-25 RX ADMIN — PANTOPRAZOLE SODIUM SCH MG: 40 TABLET, DELAYED RELEASE ORAL at 08:30

## 2018-11-25 RX ADMIN — LORAZEPAM PRN MG: 0.5 TABLET ORAL at 23:20

## 2018-11-25 RX ADMIN — GUAIFENESIN PRN MG: 100 SOLUTION ORAL at 03:50

## 2018-11-25 RX ADMIN — Medication SCH ML: at 14:30

## 2018-11-25 RX ADMIN — ACETYLCYSTEINE SCH ML: 200 SOLUTION ORAL; RESPIRATORY (INHALATION) at 13:00

## 2018-11-25 RX ADMIN — ACETYLCYSTEINE SCH ML: 200 SOLUTION ORAL; RESPIRATORY (INHALATION) at 22:49

## 2018-11-25 RX ADMIN — TAZOBACTAM SODIUM AND PIPERACILLIN SODIUM SCH MLS/HR: 375; 3 INJECTION, SOLUTION INTRAVENOUS at 16:30

## 2018-11-25 RX ADMIN — Medication PRN MG: at 03:46

## 2018-11-25 RX ADMIN — ACETYLCYSTEINE SCH ML: 200 SOLUTION ORAL; RESPIRATORY (INHALATION) at 18:50

## 2018-11-25 RX ADMIN — GUAIFENESIN PRN MG: 100 SOLUTION ORAL at 20:47

## 2018-11-25 RX ADMIN — LEVOFLOXACIN SCH MG: 5 INJECTION, SOLUTION INTRAVENOUS at 20:48

## 2018-11-25 RX ADMIN — TAZOBACTAM SODIUM AND PIPERACILLIN SODIUM SCH MLS/HR: 375; 3 INJECTION, SOLUTION INTRAVENOUS at 08:30

## 2018-11-25 NOTE — DIAGNOSTIC IMAGING REPORT
EXAMINATION:  CHEST SINGLE (PORTABLE)    



INDICATION:      

^PNA, Wheezing

^[id number]

^0615

^Y



COMPARISON:  Chest x-ray 11/22/2018

     

FINDINGS:  AP view   



TUBES and LINES:  None.



LUNGS:  Lungs are well inflated. Stable multifocal airspace opacities

concerning for pneumonia.



PLEURA:  Small pleural effusions. No pneumothorax.



HEART AND MEDIASTINUM:  The cardiomediastinal silhouette is stable.    



BONES AND SOFT TISSUES: Unchanged.



UPPER ABDOMEN: No free air under the diaphragm.    



IMPRESSION: 

Stable multifocal airspace opacities concerning for pneumonia. Recommend

follow-up x-ray after completion of therapy to document resolution.



Signed by: DR. Larry Sanchez MD on 11/25/2018 6:45 AM

## 2018-11-25 NOTE — DIAGNOSTIC IMAGING REPORT
CT SOFT TISSUE NECK WO



HISTORY: Trouble swallowing, hoarseness



COMPARISON:  Cervical spine CT 6/9/2016, chest CT 6/10/2017



TECHNIQUE: 

Axial CT images were obtained through the neck without intravenous, iodine

based contrast.

Coronal and sagittal reconstructions obtained from the axial data.  One or more

of the following dose reduction techniques were used: Automated exposure

control, adjustment of the mA and/or kV according to patient size, and/or

utilization of iterative reconstruction technique. Motion artifacts obscure

some details. The field-of-view is from the suprasellar region to the aortic

arch.



DISCUSSION:



The visualized upper aerodigestive tract is grossly unremarkable.  No

radiographically significant cervical adenopathy is seen.

 

The thyroid gland appears mildly atrophic. The submandibular glands also appear

mildly atrophic. The parotid glands are grossly unremarkable.   The ,

parapharyngeal, posterior cervical, and perivertebral spaces are unremarkable.



There are mild calcifications in the aortic arch, proximal great vessels, and

bilateral carotid bulbs. The carotid spaces, visceral space, and superior

mediastinum are otherwise grossly unremarkable. The skull base is intact.



Carotid siphon and intradural vertebral artery calcifications are present. Both

ocular lenses are thinned. Mild enophthalmos is present. Otherwise, the

visualized intracranial compartment and orbits are grossly unremarkable.  There

is mild mucosal thickening in the left maxillary sinus and left anterior

ethmoid air cells.  



There are prominent degenerative changes throughout the spine. Grade 1

anterolisthesis of C3 on C4, C7 on T1, and T1 on T2 are due to facet arthrosis.

Age indeterminate mild T1 vertebral compression deformity may be chronic; there

is no significant fracture retropulsion. Bilateral sternoclavicular arthrosis

is present as well. 



Scattered reticular opacities in the upper lungs may be due to scarring.



IMPRESSION:

1.  Mild atrophy of the thyroid gland and bilateral submandibular glands.

2.  The visualized upper aerodigestive tract is grossly unremarkable. No

radiographically significant cervical adenopathy.

3.  Otherwise, no significant findings in the carotid spaces, visceral space,

and superior mediastinum. The skull base is intact.



Signed by: Dr. Rigo Rivera M.D. on 11/25/2018 4:57 PM

## 2018-11-25 NOTE — CONSULTATION
DATE OF CONSULTATION:  November 23, 2018 



PULMONARY CONSULTATION



REASON FOR THE CONSULT:  Shortness of breath, cough, pneumonia.



HPI:  Ms. Dial is an 87-year-old female.  She presented to the emergency 

room with fever and cough.  Chest x-ray was suggestive of multifocal 

pneumonia and she reported difficulty swallowing and choking on the food.  

Speech pathology evaluation was done and diet has been changed; however, 

she is still having coughing and difficulty breathing as well.  She reports 

having these episodes of recurrent pneumonias in the past as well and she 

was seen by Dr. Sheppard in 2017 here.  She denies any complaints of chest 

pain, nausea, or vomiting.  She denies any asbestos exposure.  She is a 

lifelong nonsmoker.  She was also found to be anemic on admission with a 

hemoglobin of 6.7, went up to 9.1 after transfusion.



REVIEW OF SYSTEMS

GENERAL:  Was having fever and chills.

HEAD:  Denies any head trauma.

EENT:  Denies any earache.

CVS:  Denies any chest pain.

RESPIRATORY:  Shortness of breath, coughing.

GI:  Denies any nausea or vomiting.



The rest of the review of systems is negative except as in HPI.



PAST MEDICAL HISTORY:  Hypertension.  No other major medical issues.



PAST SURGICAL HISTORY:  Unknown.



FAMILY HISTORY AND SOCIAL HISTORY:  Mother, brother, and sister had cancer. 

 None of them had lung cancer.  She is a lifelong nonsmoker.  Lives with 

her .

PHYSICAL EXAMINATION

VITAL SIGNS:  Temperature 98.9, pulse of 80, blood pressure 108/65, 

respiratory rate of 18, O2 sat 95% on 4 liters.

HEENT:  Head atraumatic, normocephalic.

NECK:  Supple.

CHEST:  Crackles and bronchial breath sounds on the right.  Occasional 

wheezing.

HEART:  S1 and S2 audible.

ABDOMEN:  Soft, nontender, nondistended.

EXTREMITIES:  No clubbing, cyanosis, or edema.

NEUROLOGIC:  Awake, alert.  No focal neurologic deficit.



LABS:  Sodium 134, potassium 3.8, BUN 14, creatinine 0.5.  White count of 

6000, hemoglobin was 6.7 on 23rd, it is 9.1 now and platelets 171.  Chest 

x-ray, I have reviewed the images showing multifocal opacities, more so on 

the right middle and lower lobes.  She had a CT of the chest done in 2017, 

which I have reviewed the images, showing some scarring and chronic changes 

and there were emphysematous changes as well.  She had a CT in 2015, which 

I was unable to look at the films, but the report says diffuse bilateral 

air space consolidation with adjacent tree-in-bud opacities, finding 

consistent with COPD.



ASSESSMENT/PLAN:  Ms. Dial is an 87-year-old female.  She presented to 

the emergency room with cough, fever, shortness of breath, anemia with a 

hemoglobin of 6.7, difficulty eating and swallowing and diet has been 

modified; however, there is no gross aspiration per GE report.



IMPRESSION

1. Very high likelihood of pneumonia with multifocal opacities; however, 

patient is still having cough and review of previous CAT scan shows 

chronic changes with reported emphysema and nodular densities and 

scarring and likely has been going on for last 5 to 7 years and could be 

due to childhood infections and exposures.  There are no specific 

exposures per the history.  She is a lifelong nonsmoker.  Findings can 

be due to chronic obstructive asthma and bronchiolitis as well as she is 

from the era before the inhalers.  At this point, I will continue the 

patient on IV Zosyn and nebulizer treatment that has been ordered.  I 

will add Mucomyst.  I will also do a CT of the chest and neck without 

contrast to further evaluate the sensation of choking on the food along 

with cough.

2. Further recommendations would be after reviewing the CT chest.  

Patient may need bronchoscopy as well.  This was discussed in detail 

with patient and the daughter at bedside and the whole procedure was 

explained to them with risks and benefits of the procedure.

3. I will also order vest treatment as that may loosen up the phlegm.









DD:  11/25/2018 11:53

DT:  11/25/2018 12:28

Job#:  [id number] HARJEET

## 2018-11-25 NOTE — DIAGNOSTIC IMAGING REPORT
EXAMINATION: CT scan of the chest  without contrast.



TECHNIQUE: Spiral CT images of the chest were performed from the lung apices to

the level of the adrenal glands.  No intravenous contrast was administered per

physician's request. Coronal and sagittal reformatted images were obtained.



COMPARISON: Portable chest 11/25/2018



CLINICAL HISTORY:Abnormal chest x-ray



DISCUSSION:   ABSENCE OF INTRAVENOUS CONTRAST DECREASES SENSITIVITY FOR

DETECTION OF FOCAL LESIONS AND VASCULAR PATHOLOGY.



LINES/TUBES:  None.



LUNGS AND AIRWAYS:  Bilateral lower lobe compressive atelectasis. Bilateral

lower lobe wedge-shaped consolidation with air bronchograms (series 8, image

65). The right-sided consolidation extends to the subpleural space in the

lateral right lower lobe.

Linear opacity along the major fissure in the right middle lobe (sagittal image

33 and series 8, image 62), with associated bronchiectasis and similar findings

in the posteromedial lingula (series 8, image 58) likely represents

scarring/atelectasis.

Focal linear scarring and atelectasis is also noted in the right upper lobe

(coronal image 50 and series 8, image 33).

Major airways are clear, without endobronchial lesions.



PLEURA: Small bilateral pleural effusions



HEART AND MEDIASTINUM:  Mild to moderate cardiomegaly. Atherosclerotic

calcification of the coronary arteries and thoracic aorta. Ectasia of the

ascending aorta, which measures 4.1 x 4.0 cm. Main pulmonary artery is

enlarged, measuring 3.9 cm. No pericardial effusion.



LYMPH NODES: Mildly enlarged right lower paratracheal lymph node which measures

1.1 cm in short axis (series 7, image 47). No other mediastinal or any axillary

adenopathy. Difficult to assess for hilar adenopathy given the lack of

intravenous contrast.



ABDOMEN: Limited unenhanced views of the upper abdomen show no abnormality

within the visualized liver, spleen, pancreas, or kidneys. Thickening of the

left adrenal gland, likely reflecting hyperplasia.



BONES AND SOFT TISSUES: No aggressive lytic lesions. Generalized osteopenia.

Compression deformity of the T7 vertebral body with vertebroplasty changes.

Soft tissues are unremarkable.



IMPRESSION: 

1. Findings likely represent multifocal pneumonia in bilateral lower lobes.

2. Findings in the right middle lobe, right upper lobe and lingula likely

reflect chronic scarring/atelectasis.

3. Small bilateral pleural effusions and bilateral lower lobe compressive

atelectasis.

4. Mild to moderate cardiomegaly. Ectasia of the ascending aorta. Enlarged main

pulmonary artery suggesting pulmonary hypertension.

5. Mildly enlarged right lower paratracheal lymph node, likely reactive. No

other adenopathy.



Signed by: Dr. Woodrow Coleman M.D. on 11/25/2018 5:59 PM

## 2018-11-26 VITALS — SYSTOLIC BLOOD PRESSURE: 102 MMHG | DIASTOLIC BLOOD PRESSURE: 59 MMHG

## 2018-11-26 VITALS — SYSTOLIC BLOOD PRESSURE: 102 MMHG | DIASTOLIC BLOOD PRESSURE: 55 MMHG

## 2018-11-26 VITALS — SYSTOLIC BLOOD PRESSURE: 118 MMHG | DIASTOLIC BLOOD PRESSURE: 58 MMHG

## 2018-11-26 VITALS — SYSTOLIC BLOOD PRESSURE: 104 MMHG | DIASTOLIC BLOOD PRESSURE: 56 MMHG

## 2018-11-26 VITALS — DIASTOLIC BLOOD PRESSURE: 61 MMHG | SYSTOLIC BLOOD PRESSURE: 112 MMHG

## 2018-11-26 VITALS — DIASTOLIC BLOOD PRESSURE: 58 MMHG | SYSTOLIC BLOOD PRESSURE: 118 MMHG

## 2018-11-26 VITALS — DIASTOLIC BLOOD PRESSURE: 56 MMHG | SYSTOLIC BLOOD PRESSURE: 104 MMHG

## 2018-11-26 VITALS — SYSTOLIC BLOOD PRESSURE: 116 MMHG | DIASTOLIC BLOOD PRESSURE: 78 MMHG

## 2018-11-26 VITALS — DIASTOLIC BLOOD PRESSURE: 59 MMHG | SYSTOLIC BLOOD PRESSURE: 102 MMHG

## 2018-11-26 RX ADMIN — ACETYLCYSTEINE SCH ML: 200 SOLUTION ORAL; RESPIRATORY (INHALATION) at 19:35

## 2018-11-26 RX ADMIN — Medication SCH ML: at 07:10

## 2018-11-26 RX ADMIN — TAZOBACTAM SODIUM AND PIPERACILLIN SODIUM SCH MLS/HR: 375; 3 INJECTION, SOLUTION INTRAVENOUS at 00:25

## 2018-11-26 RX ADMIN — Medication SCH ML: at 13:00

## 2018-11-26 RX ADMIN — Medication SCH ML: at 19:35

## 2018-11-26 RX ADMIN — METHYLPREDNISOLONE SODIUM SUCCINATE SCH MG: 40 INJECTION, POWDER, LYOPHILIZED, FOR SOLUTION INTRAMUSCULAR; INTRAVENOUS at 20:41

## 2018-11-26 RX ADMIN — Medication PRN MG: at 00:30

## 2018-11-26 RX ADMIN — PANTOPRAZOLE SODIUM SCH MG: 40 TABLET, DELAYED RELEASE ORAL at 08:25

## 2018-11-26 RX ADMIN — Medication PRN MG: at 19:15

## 2018-11-26 RX ADMIN — TAZOBACTAM SODIUM AND PIPERACILLIN SODIUM SCH MLS/HR: 375; 3 INJECTION, SOLUTION INTRAVENOUS at 23:40

## 2018-11-26 RX ADMIN — TAZOBACTAM SODIUM AND PIPERACILLIN SODIUM SCH MLS/HR: 375; 3 INJECTION, SOLUTION INTRAVENOUS at 08:25

## 2018-11-26 RX ADMIN — Medication SCH ML: at 03:00

## 2018-11-26 RX ADMIN — ACETYLCYSTEINE SCH ML: 200 SOLUTION ORAL; RESPIRATORY (INHALATION) at 07:10

## 2018-11-26 RX ADMIN — ACETYLCYSTEINE SCH ML: 200 SOLUTION ORAL; RESPIRATORY (INHALATION) at 13:00

## 2018-11-26 RX ADMIN — LEVOFLOXACIN SCH MG: 5 INJECTION, SOLUTION INTRAVENOUS at 20:41

## 2018-11-26 RX ADMIN — TAZOBACTAM SODIUM AND PIPERACILLIN SODIUM SCH MLS/HR: 375; 3 INJECTION, SOLUTION INTRAVENOUS at 15:24

## 2018-11-26 RX ADMIN — Medication PRN MG: at 15:24

## 2018-11-26 RX ADMIN — Medication SCH ML: at 14:06

## 2018-11-27 VITALS — SYSTOLIC BLOOD PRESSURE: 109 MMHG | DIASTOLIC BLOOD PRESSURE: 55 MMHG

## 2018-11-27 VITALS — SYSTOLIC BLOOD PRESSURE: 102 MMHG | DIASTOLIC BLOOD PRESSURE: 59 MMHG

## 2018-11-27 VITALS — SYSTOLIC BLOOD PRESSURE: 105 MMHG | DIASTOLIC BLOOD PRESSURE: 58 MMHG

## 2018-11-27 VITALS — SYSTOLIC BLOOD PRESSURE: 104 MMHG | DIASTOLIC BLOOD PRESSURE: 72 MMHG

## 2018-11-27 VITALS — DIASTOLIC BLOOD PRESSURE: 60 MMHG | SYSTOLIC BLOOD PRESSURE: 114 MMHG

## 2018-11-27 VITALS — SYSTOLIC BLOOD PRESSURE: 126 MMHG | DIASTOLIC BLOOD PRESSURE: 59 MMHG

## 2018-11-27 VITALS — DIASTOLIC BLOOD PRESSURE: 59 MMHG | SYSTOLIC BLOOD PRESSURE: 126 MMHG

## 2018-11-27 PROCEDURE — 0DB38ZX EXCISION OF LOWER ESOPHAGUS, VIA NATURAL OR ARTIFICIAL OPENING ENDOSCOPIC, DIAGNOSTIC: ICD-10-PCS | Performed by: INTERNAL MEDICINE

## 2018-11-27 PROCEDURE — 0DB78ZX EXCISION OF STOMACH, PYLORUS, VIA NATURAL OR ARTIFICIAL OPENING ENDOSCOPIC, DIAGNOSTIC: ICD-10-PCS | Performed by: INTERNAL MEDICINE

## 2018-11-27 RX ADMIN — Medication SCH ML: at 00:05

## 2018-11-27 RX ADMIN — Medication SCH ML: at 14:19

## 2018-11-27 RX ADMIN — ACETYLCYSTEINE SCH ML: 200 SOLUTION ORAL; RESPIRATORY (INHALATION) at 13:00

## 2018-11-27 RX ADMIN — Medication SCH ML: at 11:00

## 2018-11-27 RX ADMIN — TAZOBACTAM SODIUM AND PIPERACILLIN SODIUM SCH MLS/HR: 375; 3 INJECTION, SOLUTION INTRAVENOUS at 16:00

## 2018-11-27 RX ADMIN — Medication SCH ML: at 13:00

## 2018-11-27 RX ADMIN — Medication SCH ML: at 19:28

## 2018-11-27 RX ADMIN — ACETYLCYSTEINE SCH ML: 200 SOLUTION ORAL; RESPIRATORY (INHALATION) at 08:06

## 2018-11-27 RX ADMIN — METHYLPREDNISOLONE SODIUM SUCCINATE SCH MG: 40 INJECTION, POWDER, LYOPHILIZED, FOR SOLUTION INTRAMUSCULAR; INTRAVENOUS at 08:20

## 2018-11-27 RX ADMIN — Medication SCH ML: at 03:32

## 2018-11-27 RX ADMIN — ACETYLCYSTEINE SCH ML: 200 SOLUTION ORAL; RESPIRATORY (INHALATION) at 00:05

## 2018-11-27 RX ADMIN — ACETYLCYSTEINE SCH ML: 200 SOLUTION ORAL; RESPIRATORY (INHALATION) at 19:28

## 2018-11-27 RX ADMIN — Medication SCH ML: at 06:45

## 2018-11-27 RX ADMIN — LEVOFLOXACIN SCH MG: 5 INJECTION, SOLUTION INTRAVENOUS at 21:45

## 2018-11-27 RX ADMIN — TAZOBACTAM SODIUM AND PIPERACILLIN SODIUM SCH MLS/HR: 375; 3 INJECTION, SOLUTION INTRAVENOUS at 08:00

## 2018-11-27 RX ADMIN — METHYLPREDNISOLONE SODIUM SUCCINATE SCH MG: 40 INJECTION, POWDER, LYOPHILIZED, FOR SOLUTION INTRAMUSCULAR; INTRAVENOUS at 21:45

## 2018-11-27 RX ADMIN — PANTOPRAZOLE SODIUM SCH MG: 40 TABLET, DELAYED RELEASE ORAL at 08:20

## 2018-11-27 NOTE — DIAGNOSTIC IMAGING REPORT
PROCEDURE:X-RAY MODIFIED BARIUM SWALLOW

 

COMPARISON:None.

 

INDICATIONS:Not provided.

 

DISCUSSION:Fluoroscopic examination was performed in conjunction with 

speech pathology, during swallowing of a variety of thin and thick 

liquid consistencies.

 

 

 

CONCLUSION:Laryngeal penetration was noted with thin liquid 

administration through a straw. No stacy aspiration. Please see the 

report from speech pathology for complete details.

 

 

Dictated by:  Kota Colon M.D. on 11/27/2018 at 8:09     

Electronically approved by:  Kota Colon M.D. on 11/27/2018 at 8:09

## 2018-11-28 VITALS — SYSTOLIC BLOOD PRESSURE: 107 MMHG | DIASTOLIC BLOOD PRESSURE: 52 MMHG

## 2018-11-28 VITALS — SYSTOLIC BLOOD PRESSURE: 117 MMHG | DIASTOLIC BLOOD PRESSURE: 56 MMHG

## 2018-11-28 VITALS — DIASTOLIC BLOOD PRESSURE: 51 MMHG | SYSTOLIC BLOOD PRESSURE: 103 MMHG

## 2018-11-28 VITALS — DIASTOLIC BLOOD PRESSURE: 55 MMHG | SYSTOLIC BLOOD PRESSURE: 110 MMHG

## 2018-11-28 VITALS — DIASTOLIC BLOOD PRESSURE: 53 MMHG | SYSTOLIC BLOOD PRESSURE: 104 MMHG

## 2018-11-28 VITALS — DIASTOLIC BLOOD PRESSURE: 54 MMHG | SYSTOLIC BLOOD PRESSURE: 113 MMHG

## 2018-11-28 VITALS — DIASTOLIC BLOOD PRESSURE: 52 MMHG | SYSTOLIC BLOOD PRESSURE: 107 MMHG

## 2018-11-28 RX ADMIN — LEVOFLOXACIN SCH MG: 5 INJECTION, SOLUTION INTRAVENOUS at 21:20

## 2018-11-28 RX ADMIN — METHYLPREDNISOLONE SODIUM SUCCINATE SCH MG: 40 INJECTION, POWDER, LYOPHILIZED, FOR SOLUTION INTRAMUSCULAR; INTRAVENOUS at 09:27

## 2018-11-28 RX ADMIN — ACETYLCYSTEINE SCH ML: 200 SOLUTION ORAL; RESPIRATORY (INHALATION) at 06:46

## 2018-11-28 RX ADMIN — Medication SCH ML: at 03:20

## 2018-11-28 RX ADMIN — Medication SCH ML: at 00:12

## 2018-11-28 RX ADMIN — Medication SCH ML: at 19:00

## 2018-11-28 RX ADMIN — Medication SCH ML: at 15:00

## 2018-11-28 RX ADMIN — TAZOBACTAM SODIUM AND PIPERACILLIN SODIUM SCH MLS/HR: 375; 3 INJECTION, SOLUTION INTRAVENOUS at 17:11

## 2018-11-28 RX ADMIN — TAZOBACTAM SODIUM AND PIPERACILLIN SODIUM SCH MLS/HR: 375; 3 INJECTION, SOLUTION INTRAVENOUS at 00:31

## 2018-11-28 RX ADMIN — METHYLPREDNISOLONE SODIUM SUCCINATE SCH MG: 40 INJECTION, POWDER, LYOPHILIZED, FOR SOLUTION INTRAMUSCULAR; INTRAVENOUS at 21:30

## 2018-11-28 RX ADMIN — ACETYLCYSTEINE SCH ML: 200 SOLUTION ORAL; RESPIRATORY (INHALATION) at 10:47

## 2018-11-28 RX ADMIN — Medication SCH ML: at 07:00

## 2018-11-28 RX ADMIN — TAZOBACTAM SODIUM AND PIPERACILLIN SODIUM SCH MLS/HR: 375; 3 INJECTION, SOLUTION INTRAVENOUS at 09:27

## 2018-11-28 RX ADMIN — Medication SCH ML: at 06:46

## 2018-11-28 RX ADMIN — PANTOPRAZOLE SODIUM SCH MG: 40 TABLET, DELAYED RELEASE ORAL at 09:27

## 2018-11-28 RX ADMIN — Medication SCH ML: at 06:47

## 2018-11-28 RX ADMIN — ACETYLCYSTEINE SCH ML: 200 SOLUTION ORAL; RESPIRATORY (INHALATION) at 00:12

## 2018-11-28 RX ADMIN — Medication SCH ML: at 10:47

## 2018-11-28 NOTE — DIAGNOSTIC IMAGING REPORT
PROCEDURE:X-RAY MODIFIED BARIUM SWALLOW

 

COMPARISON:None.

 

INDICATIONS:Pneumonia

 

DISCUSSION:Fluoroscopic examination was performed in conjunction with 

speech pathology, during swallowing of a variety of thin and thick 

liquid consistencies.

 

There is laryngeal penetration and silent and overt aspiration.

 

Fluoroscopy time: 2.0 minutes

Total dose: 10.91 mGy

 

CONCLUSION:Laryngeal penetration with silent and overt aspiration. 

Please see the report from speech pathology for complete details.

 

 

 

Jonnathan Ellis D.O.  

Dictated by:  Jonnathan Ellis D.O. on 11/28/2018 at 15:43     

Electronically approved by:  Jonnathan Ellis D.O. on 11/28/2018 at 15:43

## 2018-11-29 VITALS — SYSTOLIC BLOOD PRESSURE: 143 MMHG | DIASTOLIC BLOOD PRESSURE: 71 MMHG

## 2018-11-29 VITALS — DIASTOLIC BLOOD PRESSURE: 64 MMHG | SYSTOLIC BLOOD PRESSURE: 116 MMHG

## 2018-11-29 VITALS — SYSTOLIC BLOOD PRESSURE: 116 MMHG | DIASTOLIC BLOOD PRESSURE: 64 MMHG

## 2018-11-29 VITALS — DIASTOLIC BLOOD PRESSURE: 54 MMHG | SYSTOLIC BLOOD PRESSURE: 108 MMHG

## 2018-11-29 VITALS — DIASTOLIC BLOOD PRESSURE: 77 MMHG | SYSTOLIC BLOOD PRESSURE: 100 MMHG

## 2018-11-29 VITALS — SYSTOLIC BLOOD PRESSURE: 142 MMHG | DIASTOLIC BLOOD PRESSURE: 85 MMHG

## 2018-11-29 VITALS — SYSTOLIC BLOOD PRESSURE: 109 MMHG | DIASTOLIC BLOOD PRESSURE: 51 MMHG

## 2018-11-29 VITALS — DIASTOLIC BLOOD PRESSURE: 51 MMHG | SYSTOLIC BLOOD PRESSURE: 109 MMHG

## 2018-11-29 RX ADMIN — ACETYLCYSTEINE SCH ML: 200 SOLUTION ORAL; RESPIRATORY (INHALATION) at 07:23

## 2018-11-29 RX ADMIN — TAZOBACTAM SODIUM AND PIPERACILLIN SODIUM SCH MLS/HR: 375; 3 INJECTION, SOLUTION INTRAVENOUS at 08:48

## 2018-11-29 RX ADMIN — TAZOBACTAM SODIUM AND PIPERACILLIN SODIUM SCH MLS/HR: 375; 3 INJECTION, SOLUTION INTRAVENOUS at 17:45

## 2018-11-29 RX ADMIN — Medication SCH ML: at 14:45

## 2018-11-29 RX ADMIN — Medication SCH ML: at 07:23

## 2018-11-29 RX ADMIN — METHYLPREDNISOLONE SODIUM SUCCINATE SCH MG: 40 INJECTION, POWDER, LYOPHILIZED, FOR SOLUTION INTRAMUSCULAR; INTRAVENOUS at 21:00

## 2018-11-29 RX ADMIN — Medication SCH ML: at 00:03

## 2018-11-29 RX ADMIN — Medication SCH ML: at 23:45

## 2018-11-29 RX ADMIN — ACETYLCYSTEINE SCH ML: 200 SOLUTION ORAL; RESPIRATORY (INHALATION) at 20:45

## 2018-11-29 RX ADMIN — Medication SCH ML: at 20:45

## 2018-11-29 RX ADMIN — Medication SCH ML: at 11:15

## 2018-11-29 RX ADMIN — TAZOBACTAM SODIUM AND PIPERACILLIN SODIUM SCH MLS/HR: 375; 3 INJECTION, SOLUTION INTRAVENOUS at 00:30

## 2018-11-29 RX ADMIN — Medication SCH ML: at 03:00

## 2018-11-29 RX ADMIN — PANTOPRAZOLE SODIUM SCH MG: 40 TABLET, DELAYED RELEASE ORAL at 08:48

## 2018-11-29 RX ADMIN — Medication SCH ML: at 00:04

## 2018-11-29 RX ADMIN — METHYLPREDNISOLONE SODIUM SUCCINATE SCH MG: 40 INJECTION, POWDER, LYOPHILIZED, FOR SOLUTION INTRAMUSCULAR; INTRAVENOUS at 08:48

## 2018-11-29 RX ADMIN — LORAZEPAM PRN MG: 0.5 TABLET ORAL at 00:48

## 2018-11-29 RX ADMIN — ACETYLCYSTEINE SCH ML: 200 SOLUTION ORAL; RESPIRATORY (INHALATION) at 14:45

## 2018-11-30 VITALS — DIASTOLIC BLOOD PRESSURE: 57 MMHG | SYSTOLIC BLOOD PRESSURE: 116 MMHG

## 2018-11-30 VITALS — SYSTOLIC BLOOD PRESSURE: 99 MMHG | DIASTOLIC BLOOD PRESSURE: 56 MMHG

## 2018-11-30 VITALS — DIASTOLIC BLOOD PRESSURE: 54 MMHG | SYSTOLIC BLOOD PRESSURE: 105 MMHG

## 2018-11-30 VITALS — DIASTOLIC BLOOD PRESSURE: 58 MMHG | SYSTOLIC BLOOD PRESSURE: 108 MMHG

## 2018-11-30 VITALS — DIASTOLIC BLOOD PRESSURE: 55 MMHG | SYSTOLIC BLOOD PRESSURE: 104 MMHG

## 2018-11-30 VITALS — SYSTOLIC BLOOD PRESSURE: 116 MMHG | DIASTOLIC BLOOD PRESSURE: 57 MMHG

## 2018-11-30 VITALS — SYSTOLIC BLOOD PRESSURE: 99 MMHG | DIASTOLIC BLOOD PRESSURE: 54 MMHG

## 2018-11-30 LAB
ANION GAP SERPL CALC-SCNC: 12 MMOL/L (ref 8–16)
BASOPHILS # BLD AUTO: 0 10*3/UL (ref 0–0.1)
BASOPHILS NFR BLD AUTO: 0.2 % (ref 0–1)
BUN SERPL-MCNC: 24 MG/DL (ref 7–26)
BUN/CREAT SERPL: 40 (ref 6–25)
CALCIUM SERPL-MCNC: 9.3 MG/DL (ref 8.4–10.2)
CHLORIDE SERPL-SCNC: 95 MMOL/L (ref 98–107)
CO2 SERPL-SCNC: 37 MMOL/L (ref 22–29)
DEPRECATED NEUTROPHILS # BLD AUTO: 3.8 10*3/UL (ref 2.1–6.9)
EGFRCR SERPLBLD CKD-EPI 2021: > 60 ML/MIN (ref 60–?)
EOSINOPHIL # BLD AUTO: 0 10*3/UL (ref 0–0.4)
EOSINOPHIL NFR BLD AUTO: 0 % (ref 0–6)
ERYTHROCYTE [DISTWIDTH] IN CORD BLOOD: 29.1 % (ref 11.7–14.4)
GLUCOSE SERPLBLD-MCNC: 104 MG/DL (ref 74–118)
HCT VFR BLD AUTO: 28.4 % (ref 34.2–44.1)
HGB BLD-MCNC: 9.4 G/DL (ref 12–16)
LYMPHOCYTES # BLD: 0.9 10*3/UL (ref 1–3.2)
LYMPHOCYTES NFR BLD AUTO: 17.6 % (ref 18–39.1)
MCH RBC QN AUTO: 31.9 PG (ref 28–32)
MCHC RBC AUTO-ENTMCNC: 33.1 G/DL (ref 31–35)
MCV RBC AUTO: 96.3 FL (ref 81–99)
MONOCYTES # BLD AUTO: 0.5 10*3/UL (ref 0.2–0.8)
MONOCYTES NFR BLD AUTO: 10.2 % (ref 4.4–11.3)
NEUTS SEG NFR BLD AUTO: 71.6 % (ref 38.7–80)
PLATELET # BLD AUTO: 224 X10E3/UL (ref 140–360)
POTASSIUM SERPL-SCNC: 5 MMOL/L (ref 3.5–5.1)
RBC # BLD AUTO: 2.95 X10E6/UL (ref 3.6–5.1)
SODIUM SERPL-SCNC: 139 MMOL/L (ref 136–145)

## 2018-11-30 PROCEDURE — 0BBF8ZX EXCISION OF RIGHT LOWER LUNG LOBE, VIA NATURAL OR ARTIFICIAL OPENING ENDOSCOPIC, DIAGNOSTIC: ICD-10-PCS | Performed by: INTERNAL MEDICINE

## 2018-11-30 RX ADMIN — ACETYLCYSTEINE SCH ML: 200 SOLUTION ORAL; RESPIRATORY (INHALATION) at 10:32

## 2018-11-30 RX ADMIN — Medication SCH ML: at 06:51

## 2018-11-30 RX ADMIN — Medication SCH ML: at 10:32

## 2018-11-30 RX ADMIN — ACETYLCYSTEINE SCH ML: 200 SOLUTION ORAL; RESPIRATORY (INHALATION) at 01:00

## 2018-11-30 RX ADMIN — ACETYLCYSTEINE SCH ML: 200 SOLUTION ORAL; RESPIRATORY (INHALATION) at 20:30

## 2018-11-30 RX ADMIN — Medication SCH ML: at 10:31

## 2018-11-30 RX ADMIN — Medication SCH ML: at 20:30

## 2018-11-30 RX ADMIN — Medication SCH ML: at 03:00

## 2018-11-30 RX ADMIN — Medication SCH ML: at 14:51

## 2018-11-30 RX ADMIN — METHYLPREDNISOLONE SODIUM SUCCINATE SCH MG: 40 INJECTION, POWDER, LYOPHILIZED, FOR SOLUTION INTRAMUSCULAR; INTRAVENOUS at 21:40

## 2018-11-30 RX ADMIN — METHYLPREDNISOLONE SODIUM SUCCINATE SCH MG: 40 INJECTION, POWDER, LYOPHILIZED, FOR SOLUTION INTRAMUSCULAR; INTRAVENOUS at 09:30

## 2018-11-30 RX ADMIN — ACETYLCYSTEINE SCH ML: 200 SOLUTION ORAL; RESPIRATORY (INHALATION) at 06:51

## 2018-11-30 RX ADMIN — PANTOPRAZOLE SODIUM SCH MG: 40 TABLET, DELAYED RELEASE ORAL at 09:00

## 2018-11-30 RX ADMIN — TAZOBACTAM SODIUM AND PIPERACILLIN SODIUM SCH MLS/HR: 375; 3 INJECTION, SOLUTION INTRAVENOUS at 00:03

## 2018-11-30 RX ADMIN — Medication SCH ML: at 01:00

## 2018-11-30 RX ADMIN — Medication SCH ML: at 06:50

## 2018-11-30 NOTE — DIAGNOSTIC IMAGING REPORT
EXAMINATION:  CHEST SINGLE (PORTABLE)    



INDICATION:      

^post bronch and biopsy r/o pneumothorax



COMPARISON:  Chest CT dated 11/25/2018

     

FINDINGS:  AP view   



TUBES and LINES:  None.



LUNGS:  Lungs are well inflated. Bilateral airspace opacities are again seen.



PLEURA:  Small bilateral pleural effusions. No visible pneumothorax.



HEART AND MEDIASTINUM:  The cardiomediastinal silhouette is unremarkable.    



BONES AND SOFT TISSUES:  No acute osseous lesion.  Soft tissues are

unremarkable.



UPPER ABDOMEN: No free air under the diaphragm.    



IMPRESSION: 

No visible pneumothorax.

Bilateral airspace opacities.

Small bilateral pleural effusions.





Signed by: Dr. Robby Sanders MD on 11/30/2018 6:27 PM

## 2018-11-30 NOTE — OPERATIVE REPORT
DATE OF PROCEDURE:  November 30, 2018 

 

PREPROCEDURE DIAGNOSIS:  Cough and aspiration.



POSTPROCEDURE DIAGNOSIS:  Normal endobronchial airways.  Clear, thin 

secretions.  No mucopurulence seen. 



PROCEDURE PERFORMED:  Bronchoscopy with transbronchial lung biopsy. 



PROCEDURE DETAILS:  The bronchoscope was advanced through LMA.  Both lungs 

were examined to the segmental level.  The right upper lobe, middle lobe 

and lower lobe were examined.  Thin secretions.  No mucopurulence was seen. 

 No endobronchial lesion was seen.  Left upper lobe, lingula and lower lobe 

were examined.  No mucopurulence was seen on the left side as well.  

Transbronchial lung biopsy was done from the right lower lobe lateral 

segment where she had an area of consolidation on CAT scan.  The patient 

tolerated the procedure well.  Minimal bleeding was noted without any 

complications.  Postprocedure chest x-ray is pending.  Complications none.  

Estimated blood loss less than 2 mL.







DD:  11/30/2018 15:36

DT:  11/30/2018 15:49

Job#:  [id number]

## 2018-12-01 VITALS — SYSTOLIC BLOOD PRESSURE: 122 MMHG | DIASTOLIC BLOOD PRESSURE: 63 MMHG

## 2018-12-01 VITALS — SYSTOLIC BLOOD PRESSURE: 100 MMHG | DIASTOLIC BLOOD PRESSURE: 51 MMHG

## 2018-12-01 VITALS — SYSTOLIC BLOOD PRESSURE: 124 MMHG | DIASTOLIC BLOOD PRESSURE: 59 MMHG

## 2018-12-01 VITALS — SYSTOLIC BLOOD PRESSURE: 112 MMHG | DIASTOLIC BLOOD PRESSURE: 59 MMHG

## 2018-12-01 VITALS — DIASTOLIC BLOOD PRESSURE: 52 MMHG | SYSTOLIC BLOOD PRESSURE: 101 MMHG

## 2018-12-01 RX ADMIN — Medication SCH ML: at 11:00

## 2018-12-01 RX ADMIN — Medication SCH ML: at 00:30

## 2018-12-01 RX ADMIN — METHYLPREDNISOLONE SODIUM SUCCINATE SCH MG: 40 INJECTION, POWDER, LYOPHILIZED, FOR SOLUTION INTRAMUSCULAR; INTRAVENOUS at 09:04

## 2018-12-01 RX ADMIN — Medication SCH ML: at 07:38

## 2018-12-01 RX ADMIN — Medication SCH ML: at 23:00

## 2018-12-01 RX ADMIN — Medication SCH ML: at 19:30

## 2018-12-01 RX ADMIN — Medication SCH ML: at 15:15

## 2018-12-01 RX ADMIN — ACETYLCYSTEINE SCH ML: 200 SOLUTION ORAL; RESPIRATORY (INHALATION) at 07:38

## 2018-12-01 RX ADMIN — Medication SCH ML: at 03:00

## 2018-12-01 RX ADMIN — PANTOPRAZOLE SODIUM SCH MG: 40 TABLET, DELAYED RELEASE ORAL at 09:04

## 2018-12-01 RX ADMIN — ACETYLCYSTEINE SCH ML: 200 SOLUTION ORAL; RESPIRATORY (INHALATION) at 00:30

## 2018-12-01 NOTE — PROGRESS NOTE
DATE:  December 01, 2018 



SUBJECTIVE:  Patient is here for pneumonia.  Patient also had EGD for 

dysphagia.  Patient is alert and oriented x3, in no acute distress, feels 

much better today, and breathing better.  Patient is on albuterol and 

Atrovent treatments.  She is also on steroids 20 mg q.12 hours and Mucomyst 

with breathing treatments and also she gets lorazepam 0.5 mg for anxiety.



OBJECTIVE

VITAL SIGNS:  Temperature is 97.4, blood pressure is 124/59, and SpO2 of 

97% on 2 liters of oxygen.

GENERAL:  Alert and oriented x3.

LUNGS:  Decrease air entry plus some rhonchi in the right lung field.

ABDOMEN:  Soft.

CARDIOVASCULAR:  S1 and S2 normal.



LABS:  Recent labs from the 30th, BUN 24, creatinine 0.6.  Hemoglobin 9.4, 

hematocrit of 28.4, white count is normal at 5.28.





ASSESSMENT AND PLAN:  Pneumonia questionable aspiration.  

Esophagogastroduodenoscopy done and patient refuses percutaneous endoscopic 

gastrostomy tube placement and will continue monitor the patient.  Continue 

with antibiotics, Solu-Medrol on-board.  MBS study is noted.  Patient will 

continue on albuterol and Atrovent treatments and also Solu-Medrol 

treatment.  For further information, look in chart.  For medicines look 

into medical sheet.



Patient has allergy to MORPHINE.







DD:  12/01/2018 08:14

DT:  12/01/2018 08:47

Job#:  [id number] [id number]

## 2018-12-02 VITALS — SYSTOLIC BLOOD PRESSURE: 100 MMHG | DIASTOLIC BLOOD PRESSURE: 57 MMHG

## 2018-12-02 VITALS — SYSTOLIC BLOOD PRESSURE: 113 MMHG | DIASTOLIC BLOOD PRESSURE: 58 MMHG

## 2018-12-02 VITALS — SYSTOLIC BLOOD PRESSURE: 120 MMHG | DIASTOLIC BLOOD PRESSURE: 59 MMHG

## 2018-12-02 VITALS — DIASTOLIC BLOOD PRESSURE: 51 MMHG | SYSTOLIC BLOOD PRESSURE: 106 MMHG

## 2018-12-02 LAB
ANION GAP SERPL CALC-SCNC: 11.4 MMOL/L (ref 8–16)
ANISOCYTOSIS BLD QL SMEAR: (no result)
BASOPHILS # BLD AUTO: 0 10*3/UL (ref 0–0.1)
BASOPHILS NFR BLD AUTO: 0.1 % (ref 0–1)
BUN SERPL-MCNC: 25 MG/DL (ref 7–26)
BUN/CREAT SERPL: 41 (ref 6–25)
CALCIUM SERPL-MCNC: 9.1 MG/DL (ref 8.4–10.2)
CHLORIDE SERPL-SCNC: 95 MMOL/L (ref 98–107)
CO2 SERPL-SCNC: 37 MMOL/L (ref 22–29)
DACRYOCYTES BLD QL SMEAR: (no result)
DEPRECATED NEUTROPHILS # BLD AUTO: 4.8 10*3/UL (ref 2.1–6.9)
EGFRCR SERPLBLD CKD-EPI 2021: > 60 ML/MIN (ref 60–?)
EOSINOPHIL # BLD AUTO: 0.1 10*3/UL (ref 0–0.4)
EOSINOPHIL NFR BLD AUTO: 1 % (ref 0–6)
ERYTHROCYTE [DISTWIDTH] IN CORD BLOOD: 28.5 % (ref 11.7–14.4)
GLUCOSE SERPLBLD-MCNC: 92 MG/DL (ref 74–118)
HCT VFR BLD AUTO: 28.1 % (ref 34.2–44.1)
HGB BLD-MCNC: 9.2 G/DL (ref 12–16)
HYPOCHROMIA BLD QL SMEAR: SLIGHT
LYMPHOCYTES # BLD: 2 10*3/UL (ref 1–3.2)
LYMPHOCYTES NFR BLD AUTO: 25 % (ref 18–39.1)
MCH RBC QN AUTO: 31 PG (ref 28–32)
MCHC RBC AUTO-ENTMCNC: 32.7 G/DL (ref 31–35)
MCV RBC AUTO: 94.6 FL (ref 81–99)
MONOCYTES # BLD AUTO: 1 10*3/UL (ref 0.2–0.8)
MONOCYTES NFR BLD AUTO: 12.4 % (ref 4.4–11.3)
NEUTS SEG NFR BLD AUTO: 61 % (ref 38.7–80)
PLATELET # BLD AUTO: 219 X10E3/UL (ref 140–360)
POTASSIUM SERPL-SCNC: 4.4 MMOL/L (ref 3.5–5.1)
RBC # BLD AUTO: 2.97 X10E6/UL (ref 3.6–5.1)
RBC MORPH BLD: (no result)
SODIUM SERPL-SCNC: 139 MMOL/L (ref 136–145)

## 2018-12-02 RX ADMIN — PREDNISONE SCH MG: 20 TABLET ORAL at 08:08

## 2018-12-02 RX ADMIN — Medication SCH ML: at 11:00

## 2018-12-02 RX ADMIN — Medication SCH ML: at 14:22

## 2018-12-02 RX ADMIN — Medication SCH ML: at 23:45

## 2018-12-02 RX ADMIN — Medication SCH ML: at 07:00

## 2018-12-02 RX ADMIN — Medication SCH ML: at 11:16

## 2018-12-02 RX ADMIN — PANTOPRAZOLE SODIUM SCH MG: 40 TABLET, DELAYED RELEASE ORAL at 08:09

## 2018-12-02 RX ADMIN — Medication SCH ML: at 19:30

## 2018-12-02 RX ADMIN — Medication SCH ML: at 02:00

## 2018-12-02 NOTE — PROGRESS NOTE
DATE:  December 02, 2018 



SUBJECTIVE:  Patient is here for pneumonia.  Currently afebrile, no 

complaints, did have bowel movement yesterday, was given Dulcolax, did sit 

up on the chair and walked.  Continuously currently on oxygen.  Patient is 

getting ipratropium and albuterol treatments, guaifenesin for cough, 

lorazepam for her anxiety, and prednisone 20 mg daily too. 



OBJECTIVE

VITAL SIGNS:  Temperature is 97.7, pulse of 80, blood pressure is 101/52, 

and SpO2 of 95%. 

GENERAL:  Alert and oriented x3. 

HEENT:  Normocephalic, atraumatic. 

NECK:  No JVD present. 

LUNGS:  Decreased air entry.  Inspiratory wheezes in the upper right lung 

field. 

EXTREMITIES:  No clubbing.  No cyanosis.  No edema. 

NEUROLOGICAL:  Nonfocal. 



LABORATORY DATA:  Reviewed.  Last hemoglobin and hematocrit from 12/02 are 

9.2 and 28.2. 



ASSESSMENT AND PLAN:  Pneumonia, aspiration, status post 

esophagogastroduodenoscopy and modified barium swallow with known 

aspiration.  Plan is to continue with the antibiotics with albuterol and 

Atrovent treatment.  Patient refuses the percutaneous endoscopic 

gastrostomy tube.  We will continue to monitor the patient.  Aspiration 

precaution and Solu-Medrol for chronic obstructive pulmonary disease.  

Further recommendations based on clinical course. 







DD:  12/02/2018 07:01

DT:  12/02/2018 07:32

Job#:  [id number] [id number]

## 2018-12-03 VITALS — DIASTOLIC BLOOD PRESSURE: 55 MMHG | SYSTOLIC BLOOD PRESSURE: 117 MMHG

## 2018-12-03 VITALS — DIASTOLIC BLOOD PRESSURE: 66 MMHG | SYSTOLIC BLOOD PRESSURE: 117 MMHG

## 2018-12-03 VITALS — DIASTOLIC BLOOD PRESSURE: 58 MMHG | SYSTOLIC BLOOD PRESSURE: 123 MMHG

## 2018-12-03 VITALS — SYSTOLIC BLOOD PRESSURE: 117 MMHG | DIASTOLIC BLOOD PRESSURE: 55 MMHG

## 2018-12-03 VITALS — SYSTOLIC BLOOD PRESSURE: 117 MMHG | DIASTOLIC BLOOD PRESSURE: 66 MMHG

## 2018-12-03 RX ADMIN — PREDNISONE SCH MG: 20 TABLET ORAL at 08:14

## 2018-12-03 RX ADMIN — PANTOPRAZOLE SODIUM SCH MG: 40 TABLET, DELAYED RELEASE ORAL at 08:14

## 2018-12-03 RX ADMIN — Medication SCH ML: at 03:30

## 2018-12-03 RX ADMIN — Medication SCH ML: at 06:58

## 2018-12-05 NOTE — NUR
Clinical Swallow Evaluation/Initial Treatment Session



Patient is an 87 year old female with diagnosis of dysphagia and PNA.  Pt participated in a 
modified barium swallow study on 11/28/18.  Pt presented with mild to moderate pharyngeal 
dysphagia c/b consistent premature spillage to the level of the vallecula, silent and overt 
aspiration of thin liquids, and consistent pharyngeal residue after the swallow.  Dysphagia 
was judged to be secondary to decreased hyolaryngeal excursion, decreased coordination of 
the swallow, and decreased pharyngeal constriction.  While pt did not aspirate nectar thick 
liquids, penetration into the laryngeal vestibule was to the level of the vocal cords. It is 
suspected that during normal eating environments aspiration of nectar thick liquids would 
eventually occur.   Recommendation was made for dysphagia therapy to increase strength and 
coordination of swallow.  Patient was seen today in the outpatient clinic for initial 
treatment of Neuromuscular Electrical Stimulation (NMES) with VitalStim Therapy and 
traditional dysphagia therapy with pharyngeal exercises.  



Pt was seen with no family present.  Oral motor exam revealed function that was grossly 
within normal limits.  Patient tolerates room air.  Hearing appeared to be WFL.  Speech and 
language skills were functional.  Patient reported no change in her swallow skills since the 
modified barium swallow study.    



Provided extensive education re: basic anatomy and physiology of swallow structures, need 
for therapy, purpose of exercises and NMES, and future plan of care.  Pt indicated 
understanding.  Pt was given water and hard candy.  Pt was instructed to take small sips and 
swallow hard, feeling all the muscles in her throat contract. Placement 3b was used to 
target the mylohyoid muscle, the anterior belly of the digastric muscle, the sternohyoid 
muscle, the omohyoid muscle, the geniohyoid muscle, and the middle pharyngeal constrictors.  
Channel 1 of the electrodes was aligned horizontally just above the hyoid bone and channel 2 
of the electrodes was aligned horizontally at the level of the thyroid notch.  This 
placement was used to improve base of tongue strength, pharyngeal constriction, and UES 
function.  Pt initially tolerated 5.0 mA, but as the session progressed pt tolerated 8.5 mA. 
 Pt received 45 minutes of stimulation.  Cough noted throughout the session.  During NMES an 
exercise program was presented, demonstrated, and discussed.  Pt completed the exercises 
with minimal assistance.  A home program was assigned.  Pt verbalized understanding of the 
home exercise program.  Education provided as indicated.  All questions were answered.   Pt 
stated that she preferred to attend treatment twice per week as she re-gains her strength 
following her recent hospitalization.



Impressions:  

Pt tolerated initial session of NMES well.  She continues to report and demonstrate s/s of 
aspiration during meals which significantly interferes with her quality of life.  Pt is an 
excellent candidate for dysphagia exercises and NMES for improvement of strength and 
coordination of swallow.  



Recommendations:

1.Dysphagia therapy to include traditional exercises and NMES 2X/week for 6 weeks for a 
total of 12 treatment sessions

2.Home exercise program

3.Repeat MBS in 6 weeks with new goals to be determined at that time



Long Term Goal: 

Pt will tolerate least restrictive diet without s/s of aspiration as judged by an objective 
evaluation.



Short Term Goals:

1.Pt will complete 3 repetitions of a set of dysphagia exercises to improve laryngeal 
elevation, base of tongue retraction, and laryngeal closure, 10 repetitions per exercise, 
with minimal cues.

2.Pt will tolerate NMES for 45  60 minutes with no clinical s/s of aspiration to improve 
strength of pharyngeal constrictors, hyolaryngeal excursion, and safety with po intake.

3.Pt will complete home dysphagia exercise program targeting laryngeal elevation, base of 
tongue strength, and cricopharyngeal function independently.

4.Pt will follow aspiration precautions with independence.

5.Pt will participate in a repeat Modified Barium Swallow study to objectively re-assess 
swallow safety and function and determine safest diet.







_________________________________

Jennifer Baltazar M.A. CCC-SLP

Date of Session: 12/5/18

Dysphagia Evaluation X 64 minutes







G-code Modifiers:

Current: [id number]

Goal: [id number]



NOMS Rating for Swallowing:  Level 6


-------------------------------------------------------------------------------

Addendum: 12/05/18 at 1153 by Jennifer Baltazar 

-------------------------------------------------------------------------------

g-code justification: 

pt report

objective measurements

outcome survey

## 2018-12-12 NOTE — NUR
ST Note:



Pt's therapy session for today cancelled due to therapist's family emergency.  Pt 
rescheduled to 12/13/18. Pt also rescheduled session scheduled on 12/14/18 for a later date.

## 2018-12-21 ENCOUNTER — HOSPITAL ENCOUNTER (OUTPATIENT)
Dept: HOSPITAL 88 - ST | Age: 83
LOS: 10 days | End: 2018-12-31
Attending: INTERNAL MEDICINE
Payer: MEDICARE

## 2018-12-21 DIAGNOSIS — R13.10: Primary | ICD-10-CM

## 2018-12-21 DIAGNOSIS — J18.9: ICD-10-CM

## 2018-12-21 PROCEDURE — 92610 EVALUATE SWALLOWING FUNCTION: CPT

## 2018-12-21 PROCEDURE — 92526 ORAL FUNCTION THERAPY: CPT

## 2018-12-29 NOTE — DISCHARGE SUMMARY
DISCHARGE DIAGNOSES

1. Multifocal pneumonia.

2. Anemia.

3. Reflux.

4. Dysphagia.



HISTORY OF PRESENT ILLNESS AND HOSPITAL COURSE:  See hospital chart for 

full details.  Patient is an 87-year-old lady with history of dysphagia 

with possible aspiration pneumonia, multifocal pneumonia, brought in, 

placed on IV antibiotics and nebulizer treatments and had a modified barium 

swallow that was normal.  GI saw the patient.  It was felt that the patient 

was having some esophageal dysmotility.  So, she did have an EGD done, see 

the reports. There was no stricture.  So, she was seen by speech therapy 

who set her up for outpatient treatments.  She was also seen by pulmonary.  

At the time of discharge, she had a bronchoscopy done that did show 

actually evidence of organizing pneumonia, so patient was then put on 

steroids and was able to be discharged home on both p.o. antibiotics and 

steroids and a follow up in 1 to 2 weeks with me as well as pulmonary.  

Please see hospital chart for full details.









DD:  12/29/2018 09:02

DT:  12/29/2018 14:16

Job#:  [id number] HARJEET

## 2019-01-03 NOTE — NUR
Clinical Swallow Re-Evaluation/Treatment Session



Patient is an 88 year old female with diagnosis of dysphagia and PNA.  Pt participated in a 
modified barium swallow study on 11/28/18.  Pt presented with mild to moderate pharyngeal 
dysphagia c/b consistent premature spillage to the level of the vallecula, silent and overt 
aspiration of thin liquids, and consistent pharyngeal residue after the swallow.  Dysphagia 
was judged to be secondary to decreased hyolaryngeal excursion, decreased coordination of 
the swallow, and decreased pharyngeal constriction.  While pt did not aspirate nectar thick 
liquids, penetration into the laryngeal vestibule was to the level of the vocal cords. It is 
suspected that during normal eating environments aspiration of nectar thick liquids would 
eventually occur.   Recommendation was made for dysphagia therapy to increase strength and 
coordination of swallow.  Patient was seen today in the outpatient clinic for initial 
treatment of Neuromuscular Electrical Stimulation (NMES) with VitalStim Therapy and 
traditional dysphagia therapy with pharyngeal exercises.  



Pt was seen with no family present.  Oral motor exam revealed function that was grossly 
within normal limits.  Patient tolerates room air.  Hearing appeared to be WFL.  Speech and 
language skills were functional.  Patient reported slight improvement in her swallow skills 
since the modified barium swallow study, pt visibly fatigued at today's session.  Pt with no 
c/o pain before or after session.    



Provided extensive education re: basic anatomy and physiology of swallow structures, need 
for therapy, purpose of exercises and NMES, and future plan of care.  Pt indicated 
understanding.  Pt was given water.  Pt was instructed to take small sips and swallow hard, 
feeling all the muscles in her throat contract. Placement 2b was used to target the 
mylohyoid muscle, the sternohyoid muscle, the omohyoid muscle, the geniohyoid muscle, the 
thyrohyoid muscle, and the superior laryngeal nerve.  Channel 1 of the electrodes was 
aligned along midline over the geniohyoid belly and channel 2 of the electrodes was aligned 
horizontally on either side of the thyroid notch.  This placement was used to improve base 
of tongue strength, hyolaryngeal excursion, and UES opening.Pt initially tolerated 5.0 mA, 
but as the session progressed pt tolerated 10.5 mA.  Pt received 45 minutes of stimulation.  
Cough noted throughout the session.  During NMES an exercise program was presented, 
demonstrated, and discussed.  Pt completed the exercises with minimal assistance.  A home 
program was assigned.  Pt verbalized understanding of the home exercise program.  Education 
provided as indicated.  All questions were answered.   Pt stated that she preferred to 
attend treatment twice per week as she re-gains her strength following her recent 
hospitalization.



Impressions:  

Pt tolerated initial session of NMES well.  She continues to report and demonstrate s/s of 
aspiration during meals which significantly interferes with her quality of life.  Pt is an 
excellent candidate for dysphagia exercises and NMES for improvement of strength and 
coordination of swallow.  



Recommendations:

1.     Dysphagia therapy to include traditional exercises and NMES 2X/week for 3 weeks for a 
total of 6  treatment sessions-pt has completed 6 sessions to date

2.     Home exercise program

3.     Repeat MBS in 3-4 weeks with new goals to be determined at that time



Long Term Goal: 

Pt will tolerate least restrictive diet without s/s of aspiration as judged by an objective 
evaluation.



Short Term Goals:

1.     Pt will complete 3 repetitions of a set of dysphagia exercises to improve laryngeal 
elevation, base of tongue retraction, and laryngeal closure, 10 repetitions per exercise, 
with minimal cues.

2.     Pt will tolerate NMES for 45  60 minutes with no clinical s/s of aspiration to 
improve strength of pharyngeal constrictors, hyolaryngeal excursion, and safety with po 
intake.

3.     Pt will complete home dysphagia exercise program targeting laryngeal elevation, base 
of tongue strength, and cricopharyngeal function independently.

4.     Pt will follow aspiration precautions with independence.

5.     Pt will participate in a repeat Modified Barium Swallow study to objectively 
re-assess swallow safety and function and determine safest diet.







_________________________________

Shelley Whalen M.S. CCC-SLP

Date of Session: 01/03/19

Re-evaluation and treatment session  X 45 minutes



__________________________________________

Robin Stuherland MD                          date



G-code Modifiers:

Current: [id number]

Goal: [id number]



NOMS Rating for Swallowing:  Level 6



g-code justification: 

pt report

objective measurements

outcome survey

## 2019-01-29 ENCOUNTER — HOSPITAL ENCOUNTER (OUTPATIENT)
Dept: HOSPITAL 88 - ST | Age: 84
LOS: 2 days | End: 2019-01-31
Attending: INTERNAL MEDICINE
Payer: MEDICARE

## 2019-01-29 DIAGNOSIS — J18.9: ICD-10-CM

## 2019-01-29 DIAGNOSIS — R13.13: Primary | ICD-10-CM

## 2019-01-29 PROCEDURE — 92526 ORAL FUNCTION THERAPY: CPT

## 2019-01-29 PROCEDURE — 97139 UNLISTED THERAPEUTIC PX: CPT

## 2019-01-31 ENCOUNTER — HOSPITAL ENCOUNTER (OUTPATIENT)
Dept: HOSPITAL 88 - DX | Age: 84
End: 2019-01-31
Attending: INTERNAL MEDICINE
Payer: MEDICARE

## 2019-01-31 DIAGNOSIS — J18.9: ICD-10-CM

## 2019-01-31 DIAGNOSIS — R13.13: Primary | ICD-10-CM

## 2019-01-31 PROCEDURE — 74230 X-RAY XM SWLNG FUNCJ C+: CPT

## 2019-01-31 NOTE — DIAGNOSTIC IMAGING REPORT
PROCEDURE: X-RAY MODIFIED BARIUM SWALLOW



COMPARISON: None. 



INDICATION: Dysphagia. 



Radiation Details:

Fluoroscopy time:  1.7 minutes

Cumulative dose: 8.59 mGy



DISCUSSION: Fluoroscopic examination was performed in conjunction with speech

pathology during swallowing a variety of thin and thick liquid consistencies.

Provided images demonstrate laryngeal penetration. No evidence of aspiration.

There is trace vallecular, pyriform sinus, and pharyngeal wall residue. 



Note is made of grade 1 anterolisthesis of C3 on C4. Degenerative disc changes

of the cervical spine. 



CONCLUSION: 

Modified barium swallow demonstrating laryngeal penetration and no evidence of

aspiration. Please refer to the speech pathology report for further details.



Grade 1 anterolisthesis of C3 on C4 and degenerative disc changes of the

cervical spine. Findings better characterized on CT cervical spine from

6/9/2016.



Signed by: Dr. Fili Esteban MD on 1/31/2019 2:56 PM

## 2019-02-07 ENCOUNTER — HOSPITAL ENCOUNTER (OUTPATIENT)
Dept: HOSPITAL 88 - RESP | Age: 84
End: 2019-02-07
Payer: MEDICARE

## 2019-02-07 DIAGNOSIS — J45.40: Primary | ICD-10-CM

## 2019-02-07 PROCEDURE — 94060 EVALUATION OF WHEEZING: CPT

## 2019-02-07 PROCEDURE — 94729 DIFFUSING CAPACITY: CPT

## 2019-02-07 PROCEDURE — 94727 GAS DIL/WSHOT DETER LNG VOL: CPT

## 2019-02-21 ENCOUNTER — HOSPITAL ENCOUNTER (OUTPATIENT)
Dept: HOSPITAL 88 - ST | Age: 84
LOS: 7 days | End: 2019-02-28
Attending: INTERNAL MEDICINE
Payer: MEDICARE

## 2019-02-21 DIAGNOSIS — J18.9: ICD-10-CM

## 2019-02-21 DIAGNOSIS — R13.13: Primary | ICD-10-CM

## 2019-03-12 NOTE — NUR
Clinical Swallow Re-Evaluation/Treatment Session



Patient is an 88 year old female with diagnosis of dysphagia and PNA.  Pt participated in a 
modified barium swallow study on 11/28/18.  Pt presented with mild to moderate pharyngeal 
dysphagia c/b consistent premature spillage to the level of the vallecula, silent and overt 
aspiration of thin liquids, and consistent pharyngeal residue after the swallow.  Dysphagia 
was judged to be secondary to decreased hyolaryngeal excursion, decreased coordination of 
the swallow, and decreased pharyngeal constriction.  While pt did not aspirate nectar thick 
liquids, penetration into the laryngeal vestibule was to the level of the vocal cords. It is 
suspected that during normal eating environments aspiration of nectar thick liquids would 
eventually occur.   MBS from 1/31/19: pt presented with mild pharyngeal dysphagia c/b 
consistent premature spillage to the level of the vallecula, penetration into the laryngeal 
vestibule 

with thin liquids, and consistent pharyngeal trace esidue after the swallow.  Dysphagia was 
judged to be secondary to decreased pharyngeal constriction, decreased coordination of the 
swallow, and decreased hyolaryngeal excursion.  This is a signficant 

mprovment from the MBS completed 11/28/18 and justifies continued treatment with the goal of 
eliminating laryngeal penetration with thin liquids. Recommendation was made for dysphagia 
therapy to increase strength and coordination of swallow.  Patient was seen today in the 
outpatient clinic for swallow re-evaluation and treatment of Neuromuscular Electrical 
Stimulation (NMES) with VitalStim Therapy and traditional dysphagia therapy with pharyngeal 
exercises.  



Pt was seen with no family present.  Oral motor exam revealed function that was grossly 
within normal limits.  Patient tolerates room air.  Hearing appeared to be WFL.  Speech and 
language skills were functional.  Patient reported slight improvement in her swallow skills 
since the modified barium swallow study, pt visibly fatigued at today's session.  Pt with no 
c/o pain before or after session.    



Provided extensive education re: basic anatomy and physiology of swallow structures, need 
for therapy, purpose of exercises and NMES, and future plan of care.  Pt indicated 
understanding.  Pt was given water.  Pt was instructed to take small sips and swallow hard, 
feeling all the muscles in her throat contract. Placement 2b was used to target the 
mylohyoid muscle, the sternohyoid muscle, the omohyoid muscle, the geniohyoid muscle, the 
thyrohyoid muscle, and the superior laryngeal nerve.  Channel 1 of the electrodes was 
aligned along midline over the geniohyoid belly and channel 2 of the electrodes was aligned 
horizontally on either side of the thyroid notch.  This placement was used to improve base 
of tongue strength, hyolaryngeal excursion, and UES opening.Pt initially tolerated 6.0 mA, 
but as the session progressed pt tolerated 10.5 mA.  Pt received 60 minutes of stimulation.  
Cough noted throughout the session.  During NMES an exercise program was presented, 
demonstrated, and discussed.  Pt completed the exercises with minimal assistance.  A home 
program was assigned.  Pt verbalized understanding of the home exercise program.  Education 
provided as indicated.  All questions were answered.   Pt stated that she preferred to 
attend treatment twice per week as she re-gains her strength following her recent 
hospitalization.



Impressions:  

Pt tolerated initial session of NMES well.  She continues to report and demonstrate s/s of 
aspiration during meals which significantly interferes with her quality of life.  Pt is an 
excellent candidate for dysphagia exercises and NMES for improvement of strength and 
coordination of swallow.  



Recommendations:

1.     Dysphagia therapy to include traditional exercises and NMES 2X/week for 3 weeks for a 
total of 6  treatment sessions-pt has completed 6 sessions to date

2.     Home exercise program

3.     Repeat MBS in 3-4 weeks with new goals to be determined at that time



Long Term Goal: 

Pt will tolerate least restrictive diet without s/s of aspiration as judged by an objective 
evaluation.



Short Term Goals:

1.     Pt will complete 3 repetitions of a set of dysphagia exercises to improve laryngeal 
elevation, base of tongue retraction, and laryngeal closure, 10 repetitions per exercise, 
with minimal cues.

2.     Pt will tolerate NMES for 45  60 minutes with no clinical s/s of aspiration to 
improve strength of pharyngeal constrictors, hyolaryngeal excursion, and safety with po 
intake.

3.     Pt will complete home dysphagia exercise program targeting laryngeal elevation, base 
of tongue strength, and cricopharyngeal function independently.

4.     Pt will follow aspiration precautions with independence.

5.     Pt will participate in a repeat Modified Barium Swallow study to objectively 
re-assess swallow safety and function and determine safest diet.







_________________________________

Shelley Whalen M.S. CCC-SLP

Date of Session: 03/12/19

Re-evaluation and treatment session  X 65 minutes

MARIVEL NOMS swallow level 6



__________________________________________

MD roxanna Botello
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
## 2019-03-28 ENCOUNTER — HOSPITAL ENCOUNTER (OUTPATIENT)
Dept: HOSPITAL 88 - ST | Age: 84
LOS: 3 days | End: 2019-03-31
Attending: INTERNAL MEDICINE
Payer: MEDICARE

## 2019-03-28 DIAGNOSIS — Z87.01: ICD-10-CM

## 2019-03-28 DIAGNOSIS — R13.13: Primary | ICD-10-CM

## 2019-04-09 ENCOUNTER — HOSPITAL ENCOUNTER (OUTPATIENT)
Dept: HOSPITAL 88 - ST | Age: 84
LOS: 21 days | End: 2019-04-30
Attending: INTERNAL MEDICINE
Payer: MEDICARE

## 2019-04-09 DIAGNOSIS — J18.9: ICD-10-CM

## 2019-04-09 DIAGNOSIS — R13.13: Primary | ICD-10-CM

## 2019-04-11 ENCOUNTER — HOSPITAL ENCOUNTER (OUTPATIENT)
Dept: HOSPITAL 88 - DX | Age: 84
End: 2019-04-11
Attending: INTERNAL MEDICINE
Payer: MEDICARE

## 2019-04-11 DIAGNOSIS — R13.10: Primary | ICD-10-CM

## 2019-04-11 DIAGNOSIS — J18.9: ICD-10-CM

## 2019-04-11 PROCEDURE — 74230 X-RAY XM SWLNG FUNCJ C+: CPT

## 2019-04-11 NOTE — DIAGNOSTIC IMAGING REPORT
Exam: Modified barium swallow.



History: Dysphagia with pneumonia



Comparison: None available



Findings: Fluoroscopic evaluation during swallowing was performed in

conjunction with Speech Pathology. Patient was observed swallowing barium

impregnated liquids and semisolid's.



There is trace penetration into the laryngeal vestibule with thin liquids.

Trace aspiration with thin liquids also noted. No clearing with cough.



Fluoroscopy time: 1.4 minutes

Total dose: 15.55 mGy





Impression:



Laryngeal penetration and aspiration. Please see the full report provided by

Speech Pathology.



Signed by: Dr. Jonnathan Ellis DO on 4/11/2019 2:40 PM

## 2019-10-04 NOTE — DIAGNOSTIC IMAGING REPORT
PATIENT IS IN IMMEDIATE NEED OF HAVING AN RX FOR SYRINGES SENT TO [facility] IN [place]. HE IS UNABLE TO TAKE INSULIN WITHOUT SYRINGES.    PROCEDURE:X-RAY THORACIC SPINE, TWO VIEWS

 

COMPARISON:None.

 

INDICATIONS:FALL

 

FINDINGS:

The vertebral body and disc space heights are well-maintained. There is 

good spinal alignment. There are no acute displaced fractures, lytic or 

blastic lesions. Degenerative changes evidenced by anterior 

osteophytosis at multiple levels. Kyphoplasty changes.

 

CONCLUSION:

No acute radiographic abnormality.

 

Dictated by:  Manny Kim M.D. on 12/28/2017 at 9:38     

Electronically approved by:  Manny Kim M.D. on 12/28/2017 at 9:38

## 2019-11-29 ENCOUNTER — HOSPITAL ENCOUNTER (OUTPATIENT)
Dept: HOSPITAL 88 - FSED | Age: 84
Setting detail: OBSERVATION
LOS: 2 days | Discharge: HOME | End: 2019-12-01
Attending: INTERNAL MEDICINE | Admitting: INTERNAL MEDICINE
Payer: MEDICARE

## 2019-11-29 VITALS — DIASTOLIC BLOOD PRESSURE: 67 MMHG | SYSTOLIC BLOOD PRESSURE: 147 MMHG

## 2019-11-29 VITALS — SYSTOLIC BLOOD PRESSURE: 120 MMHG | DIASTOLIC BLOOD PRESSURE: 56 MMHG

## 2019-11-29 VITALS — BODY MASS INDEX: 22.93 KG/M2 | HEIGHT: 63 IN | WEIGHT: 129.38 LBS

## 2019-11-29 VITALS — DIASTOLIC BLOOD PRESSURE: 56 MMHG | SYSTOLIC BLOOD PRESSURE: 120 MMHG

## 2019-11-29 DIAGNOSIS — K21.9: ICD-10-CM

## 2019-11-29 DIAGNOSIS — J44.1: ICD-10-CM

## 2019-11-29 DIAGNOSIS — J96.91: Primary | ICD-10-CM

## 2019-11-29 DIAGNOSIS — J84.10: ICD-10-CM

## 2019-11-29 DIAGNOSIS — M79.10: ICD-10-CM

## 2019-11-29 DIAGNOSIS — D64.9: ICD-10-CM

## 2019-11-29 DIAGNOSIS — M54.5: ICD-10-CM

## 2019-11-29 LAB
CK MB SERPL-MCNC: 3.4 NG/ML (ref 0–5)
CK MB SERPL-MCNC: 4.6 NG/ML (ref 0–5)
CK SERPL-CCNC: 119 IU/L (ref 29–168)
CK SERPL-CCNC: 76 IU/L (ref 29–168)

## 2019-11-29 PROCEDURE — 80048 BASIC METABOLIC PNL TOTAL CA: CPT

## 2019-11-29 PROCEDURE — 84484 ASSAY OF TROPONIN QUANT: CPT

## 2019-11-29 PROCEDURE — 82550 ASSAY OF CK (CPK): CPT

## 2019-11-29 PROCEDURE — 85025 COMPLETE CBC W/AUTO DIFF WBC: CPT

## 2019-11-29 PROCEDURE — 85379 FIBRIN DEGRADATION QUANT: CPT

## 2019-11-29 PROCEDURE — 71046 X-RAY EXAM CHEST 2 VIEWS: CPT

## 2019-11-29 PROCEDURE — 82553 CREATINE MB FRACTION: CPT

## 2019-11-29 PROCEDURE — 93005 ELECTROCARDIOGRAM TRACING: CPT

## 2019-11-29 PROCEDURE — 93971 EXTREMITY STUDY: CPT

## 2019-11-29 PROCEDURE — 36415 COLL VENOUS BLD VENIPUNCTURE: CPT

## 2019-11-29 PROCEDURE — 71260 CT THORAX DX C+: CPT

## 2019-11-29 PROCEDURE — 99284 EMERGENCY DEPT VISIT MOD MDM: CPT

## 2019-11-29 PROCEDURE — 72070 X-RAY EXAM THORAC SPINE 2VWS: CPT

## 2019-11-29 PROCEDURE — 83880 ASSAY OF NATRIURETIC PEPTIDE: CPT

## 2019-11-29 PROCEDURE — 80053 COMPREHEN METABOLIC PANEL: CPT

## 2019-11-29 PROCEDURE — 94640 AIRWAY INHALATION TREATMENT: CPT

## 2019-11-29 PROCEDURE — 96374 THER/PROPH/DIAG INJ IV PUSH: CPT

## 2019-11-29 RX ADMIN — ACETAMINOPHEN SCH MG: 10 INJECTION, SOLUTION INTRAVENOUS at 16:49

## 2019-11-29 NOTE — DIAGNOSTIC IMAGING REPORT
EXAM: Unilateral Lower Extremity Duplex Ultrasound

INDICATION: Right leg pain, swelling

COMPARISON:  None 

TECHNIQUE: Gray scale, color Doppler and spectral waveform analysis of the

unilateral lower extremity deep venous system was performed. 



FINDINGS: 

Common Femoral: 

Fully compressible with normal spontaneous waveforms. 



Proximal Greater Saphenous:

Fully compressible.

 

Femoral: 

Fully compressible with normal spontaneous waveforms.  Normal response to

augmentation.



Proximal Deep Femoral:

Normal spontaneous waveforms. 



Popliteal:     

Fully compressible with normal spontaneous waveforms.



The visualized tibial veins are patent without evidence of thrombosis.



Soft tissue swelling and subcutaneous soft tissue edema at the ankle  



Incidental note is made of a 6 x 5 x 3 mm eccentric plaque at the right common

femoral artery.



IMPRESSION:     

No evidence of deep venous thrombosis.



Subcutaneous soft tissue edema at the ankle without focal mass or fluid

collection.



Signed by: Gio Santos MD on 11/29/2019 11:54 AM

## 2019-11-29 NOTE — NUR
Patient offered the Flu vaccine per eMAR. Patient stated she would take it as soon as she 
feels better.

## 2019-11-29 NOTE — DIAGNOSTIC IMAGING REPORT
EXAMINATION:  CXR 2 VIEW - [facility]    



INDICATION: Back pain



COMPARISON: Chest radiograph of 11/22/2018

     

FINDINGS:



LINES/TUBES:None



LUNGS:The lungs are hyperinflated. There is perihilar fullness and

indistinctness of the pulmonary vasculature. No focal consolidation.



PLEURA:No pleural effusion or pneumothorax.



MEDIASTINUM:Cardiomediastinal silhouette is stably enlarged. Atherosclerotic

calcifications of the thoracic aorta.



BONES/SOFT TISSUES:Mild diffuse osteopenia. Status post vertebroplasty at a

midthoracic fractured vertebral body.



ABDOMEN:No free air under the diaphragm.





IMPRESSION: 

Hyperinflated lungs. Mild pulmonary interstitial edema.



Signed by: Gio Santos MD on 11/29/2019 11:00 AM

## 2019-11-29 NOTE — DIAGNOSTIC IMAGING REPORT
EXAM: CT Chest WITH contrast- Pulmonary Embolism Protocol



INDICATION: Chest pain, shortness of breath 



COMPARISON: Chest radiograph of earlier the same day



TECHNIQUE:

Chest was scanned utilizing a multidetector helical scanner from the lung apex

through the level of the diaphragm after administration of IV contrast. Thin

section reconstructions were obtained with special concentration on the

pulmonary arteries. Coronal and sagittal reformations were obtained. Pulmonary

embolism protocol was performed.

           IV CONTRAST: 100 cc of Isovue 370

           RADIATION DOSE: Total DLP: 209.5 mGy*cm

            

Dose modulation, iterative reconstruction, and/or weight based adjustment of

the mA/kV was utilized to reduce the radiation dose to as low as reasonably

achievable. 



           COMPLICATIONS: None



FINDINGS:



LINES/ TUBES: None.



PULMONARY ARTERIES: No filling defect is identified within the pulmonary

arteries to the segmental level. The subsegmental pulmonary arteries are not

well opacified. The main pulmonary artery is enlarged, measuring up to 4.2 cm

in diameter. 



LUNGS AND AIRWAYS: The central airways are patent. Mild bilateral smooth

interlobular septal thickening compatible with interstitial edema. Right

greater than left biapical pleural parenchymal thickening/scarring. Right

greater than left upper lobe increased reticular opacities and traction

bronchiectasis. Bilateral posterior lower lobe subpleural cystic changes

without honeycombing. Spiculated right apical pulmonary nodule measures up to 9

mm additional 8 mm right upper lobe pulmonary nodule (series 4 image 32). Right

lower lobe 5 mm pulmonary nodule (series 4 image 87). (series 4 image 17). 



PLEURA: The pleural spaces are clear.



HEART AND MEDIASTINUM: The thyroid gland is normal.  No supraclavicular,

mediastinal, or hilar lymphadenopathy. Multichamber cardiomegaly.

Atherosclerotic calcifications involve the coronary arteries, aorta, and

proximal great vessels.. No aortic aneurysm or dissection.



UPPER ABDOMEN: Limited images of the upper abdomen demonstrate no focal

abnormality of the partially visualized liver, spleen, left adrenal, or left

upper kidney. The right adrenal and kidney and pancreas are not visualized.



BONES: No acute osseous injury. Mild diffuse osteopenia. Old T7 compression

fracture status post vertebral augmentation. Grade 1 anterolisthesis at C7-T1

and T1-T2. No suspicious lytic or blastic lesions.



SOFT TISSUES: Unremarkable.



IMPRESSION: 

No pulmonary embolism. Dilated main pulmonary artery measures up to 4.2 cm in

diameter, consistent with pulmonary artery hypertension.



Mild interstitial pulmonary edema.



Pulmonary nodules as above, most notably a 9 mm spiculated nodule at the right

lung apex.



Increased reticular opacities and traction bronchiectasis as well as bilateral

posterior lower lobe subpleural cystic changes without honeycombing consistent

with chronic interstitial fibrosis.



Atherosclerotic calcifications including of the coronary arteries.



RECOMMENDATIONS:

If the patient is low risk, recommend follow-up chest CT at 3-6 months then

consider CT at 18-24 months. If the patient is high-risk, recommend chest CT at

3-6 months then again at 18-24 months per Fleischner society recommendations

2017.



Signed by: Gio Santos MD on 11/29/2019 1:14 PM

## 2019-11-30 VITALS — SYSTOLIC BLOOD PRESSURE: 138 MMHG | DIASTOLIC BLOOD PRESSURE: 62 MMHG

## 2019-11-30 VITALS — SYSTOLIC BLOOD PRESSURE: 162 MMHG | DIASTOLIC BLOOD PRESSURE: 89 MMHG

## 2019-11-30 VITALS — DIASTOLIC BLOOD PRESSURE: 60 MMHG | SYSTOLIC BLOOD PRESSURE: 124 MMHG

## 2019-11-30 VITALS — SYSTOLIC BLOOD PRESSURE: 145 MMHG | DIASTOLIC BLOOD PRESSURE: 67 MMHG

## 2019-11-30 VITALS — DIASTOLIC BLOOD PRESSURE: 62 MMHG | SYSTOLIC BLOOD PRESSURE: 138 MMHG

## 2019-11-30 VITALS — SYSTOLIC BLOOD PRESSURE: 101 MMHG | DIASTOLIC BLOOD PRESSURE: 51 MMHG

## 2019-11-30 VITALS — DIASTOLIC BLOOD PRESSURE: 67 MMHG | SYSTOLIC BLOOD PRESSURE: 145 MMHG

## 2019-11-30 VITALS — SYSTOLIC BLOOD PRESSURE: 146 MMHG | DIASTOLIC BLOOD PRESSURE: 67 MMHG

## 2019-11-30 VITALS — SYSTOLIC BLOOD PRESSURE: 121 MMHG | DIASTOLIC BLOOD PRESSURE: 56 MMHG

## 2019-11-30 LAB
ANION GAP SERPL CALC-SCNC: 14.2 MMOL/L (ref 8–16)
BASOPHILS # BLD AUTO: 0 10*3/UL (ref 0–0.1)
BASOPHILS NFR BLD AUTO: 0.2 % (ref 0–1)
BUN SERPL-MCNC: 15 MG/DL (ref 7–26)
BUN/CREAT SERPL: 19 (ref 6–25)
CALCIUM SERPL-MCNC: 8.5 MG/DL (ref 8.4–10.2)
CHLORIDE SERPL-SCNC: 101 MMOL/L (ref 98–107)
CK MB SERPL-MCNC: 2.7 NG/ML (ref 0–5)
CK SERPL-CCNC: 59 IU/L (ref 29–168)
CO2 SERPL-SCNC: 26 MMOL/L (ref 22–29)
DEPRECATED NEUTROPHILS # BLD AUTO: 2.4 10*3/UL (ref 2.1–6.9)
EGFRCR SERPLBLD CKD-EPI 2021: > 60 ML/MIN (ref 60–?)
EOSINOPHIL # BLD AUTO: 0.1 10*3/UL (ref 0–0.4)
EOSINOPHIL NFR BLD AUTO: 1.1 % (ref 0–6)
ERYTHROCYTE [DISTWIDTH] IN CORD BLOOD: 15 % (ref 11.7–14.4)
GLUCOSE SERPLBLD-MCNC: 81 MG/DL (ref 74–118)
HCT VFR BLD AUTO: 25.7 % (ref 34.2–44.1)
HGB BLD-MCNC: 8.2 G/DL (ref 12–16)
LYMPHOCYTES # BLD: 1.3 10*3/UL (ref 1–3.2)
LYMPHOCYTES NFR BLD AUTO: 28.2 % (ref 18–39.1)
MCH RBC QN AUTO: 31.7 PG (ref 28–32)
MCHC RBC AUTO-ENTMCNC: 31.9 G/DL (ref 31–35)
MCV RBC AUTO: 99.2 FL (ref 81–99)
MONOCYTES # BLD AUTO: 0.9 10*3/UL (ref 0.2–0.8)
MONOCYTES NFR BLD AUTO: 18.3 % (ref 4.4–11.3)
NEUTS SEG NFR BLD AUTO: 52 % (ref 38.7–80)
PLATELET # BLD AUTO: 164 X10E3/UL (ref 140–360)
POTASSIUM SERPL-SCNC: 4.2 MMOL/L (ref 3.5–5.1)
RBC # BLD AUTO: 2.59 X10E6/UL (ref 3.6–5.1)
SODIUM SERPL-SCNC: 137 MMOL/L (ref 136–145)

## 2019-11-30 RX ADMIN — IPRATROPIUM BROMIDE AND ALBUTEROL SULFATE SCH ML: .5; 2.5 SOLUTION RESPIRATORY (INHALATION) at 13:15

## 2019-11-30 RX ADMIN — PREDNISONE SCH MG: 20 TABLET ORAL at 14:20

## 2019-11-30 RX ADMIN — TRAMADOL HYDROCHLORIDE PRN MG: 50 TABLET, FILM COATED ORAL at 19:04

## 2019-11-30 RX ADMIN — TRAMADOL HYDROCHLORIDE PRN MG: 50 TABLET, FILM COATED ORAL at 08:33

## 2019-11-30 RX ADMIN — IPRATROPIUM BROMIDE AND ALBUTEROL SULFATE SCH ML: .5; 2.5 SOLUTION RESPIRATORY (INHALATION) at 20:30

## 2019-11-30 RX ADMIN — ACETAMINOPHEN SCH MG: 10 INJECTION, SOLUTION INTRAVENOUS at 00:30

## 2019-11-30 RX ADMIN — ACETAMINOPHEN SCH MG: 10 INJECTION, SOLUTION INTRAVENOUS at 06:45

## 2019-11-30 RX ADMIN — TRAMADOL HYDROCHLORIDE PRN MG: 50 TABLET, FILM COATED ORAL at 14:29

## 2019-11-30 RX ADMIN — PANTOPRAZOLE SODIUM SCH MG: 40 TABLET, DELAYED RELEASE ORAL at 08:33

## 2019-11-30 NOTE — NUR
Nutrition Screen Note



RD Recommendation for Physician: Continue diet as ordered 



Plan of Care: RD following, monitoring for tolerance and adequacy



Nutrition reason for involvement:

Primary Diagnosis of CHF



Primary Diagnose(s) CHF, anemia, back pain, dyspnea



PMH:COPD HTN



Ht: 61in 

Wt:175.056lb

BMI:33.1 kg/m2

IBW:105lb +/-10%



RD Assessment:

(11/30/2019) Chart reviewed. Labs and meds reviewed. Initial encounter with patient.visit.  
Pt usually eats well and can feed herself. Pt denies any N,VD. Pt denies any difficulty 
chewing or swallowing. No significant wt changes 



Current Diet: Cardiac diet 



Malnutrition Evaluation (11/30/2019)

The patient does not meet criteria for a specified degree of malnutrition at this time. Will 
re-evaluate at follow-up as appropriate. 







Diet Education Needs Assessment:



Diet education not indicated due pt being familiar with low sodium diet  







Nutrition Care Level: Low





Signed: Devon Crespo RD, LD, [facility]

## 2019-11-30 NOTE — CONSULTATION
DATE OF CONSULTATION:  

 

Pulmonary Consultation 

 

REASON FOR CONSULT:  Shortness of breath.

 

HISTORY OF PRESENT ILLNESS:  Ms. Dial is an 88-year-old female, very well known to me

from my office visit and previous admission here.  She was in the hospital last time in

December of 2018 when she was diagnosed with organizing pneumonia, which was

biopsy-proven.  She received IV Solu-Medrol, improved dramatically, and was discharged

home.  She has been following up regularly with me in the office.  She has chronic

obstructive asthma.  She has never smoked in her life, but her PFTs are suggestive of

severe obstruction.  She came at this time with the low back pain and shortness of

breath.  Reports that shortness of breath has improved.  She denies any chest pain,

nausea, or vomiting.  She reports that the back pain was constant, lower back,

progressively got worse, was relieved by laying down, but getting worse when she was

trying to take deep breath or moving.  She underwent a CT of the chest in the emergency

room, which showed no evidence of pulmonary embolism.  The nodules have been described,

however, they are much better than the last one.  She has some traction bronchiectasis

and chronic obstructive disease, which is likely due to childhood infection versus

chronic obstructive asthma. 

 

REVIEW OF SYSTEMS:

GENERAL:  Denies any fever or chills. 

HEAD:  Denies any head trauma. 

ENT:  Denies any earache. 

CVS:  Denies any chest pain. 

RESPIRATORY:  Shortness of breath. 

GI:  Denies any nausea or vomiting. 

 

The rest of the review of systems are negative except as in HPI.

 

PAST MEDICAL HISTORY:  Chronic obstructive asthma and hypertension.

 

PAST SURGICAL HISTORY:  None.

 

FAMILY AND SOCIAL HISTORY:  Mother, brother, and sister had cancer.  She is a lifelong

nonsmoker. 

 

PHYSICAL EXAMINATION:

VITAL SIGNS:  Temperature 97, pulse of 76, blood pressure 146/67, and respiratory rate

of 18. 

HEENT:  Head is atraumatic, normocephalic. 

NECK:  Supple. 

CHEST:  Reduced air entry bilaterally. 

HEART:  S1-S2 audible. 

ABDOMEN:  Mildly reduced air entry bilaterally.  Good respiratory effort. 

HEART:  S1, S2 audible.  No murmurs, gallops, rub. 

ABDOMEN:  Soft. 

EXTREMITIES:  No pedal edema. 

NEUROLOGICAL:  She is awake, alert, following commands.  No focal neurologic deficit.

LABORATORY DATA:  White count of 4.65, hemoglobin 8.2, and platelets 164.  Chemistries

within normal limits.  Chest CT is showing evidence of air trapping and bronchiectasis.

I have reviewed the images.  The nodule that has been described is actually possibly an

improvement from her previous nodularity.  However, she will need a repeat CT. 

 

ASSESSMENT:  Ms. Dial is an 88-year-old female, very well known to me from my office

visit and previous admission.  She had a history of organizing pneumonia in November of 2018.  She has chronic obstructive asthma with severe obstruction on PFT and despite the

fact the patient has never smoked, she has COPD due to chronic obstructive asthma.  She

also has traction bronchiectasis and air trapping.  She has been doing well with as

needed nebulizer.  She is not on any maintenance inhaler at home. 

 

PLAN:  At this point, I do not see any pneumonia on the CT scan.  She possibly has a

flare up of her chronic obstructive asthma versus she had microaspiration previously.  A

swallowing study was done and she had modified diet.  She told me that she has an

appointment with Dr. So, which she will follow up and will get outpatient

procedure.  I will start low-dose p.o. prednisone and nebulizer treatment on her oxygen

as needed to keep the O2 saturation more than or equal to 92%. 

 

Thank you for this consult.

 

 

 

 

______________________________

MD MAITE Fields/YONG

D:  11/30/2019 13:02:30

T:  11/30/2019 16:45:47

Job #:  [id number]

## 2019-12-01 VITALS — SYSTOLIC BLOOD PRESSURE: 127 MMHG | DIASTOLIC BLOOD PRESSURE: 56 MMHG

## 2019-12-01 VITALS — SYSTOLIC BLOOD PRESSURE: 105 MMHG | DIASTOLIC BLOOD PRESSURE: 55 MMHG

## 2019-12-01 VITALS — DIASTOLIC BLOOD PRESSURE: 56 MMHG | SYSTOLIC BLOOD PRESSURE: 127 MMHG

## 2019-12-01 RX ADMIN — PANTOPRAZOLE SODIUM SCH MG: 40 TABLET, DELAYED RELEASE ORAL at 08:11

## 2019-12-01 RX ADMIN — IPRATROPIUM BROMIDE AND ALBUTEROL SULFATE SCH ML: .5; 2.5 SOLUTION RESPIRATORY (INHALATION) at 01:00

## 2019-12-01 RX ADMIN — IPRATROPIUM BROMIDE AND ALBUTEROL SULFATE SCH ML: .5; 2.5 SOLUTION RESPIRATORY (INHALATION) at 07:40

## 2019-12-01 RX ADMIN — PREDNISONE SCH MG: 20 TABLET ORAL at 08:11

## 2019-12-01 RX ADMIN — TRAMADOL HYDROCHLORIDE PRN MG: 50 TABLET, FILM COATED ORAL at 04:42

## 2019-12-01 NOTE — NUR
MET WITH DR. HENDRICKSON EARLY THIS MORNING AND DISCUSSED PT STATUS. HE STATED TO KEEP PT 
OBSERVATION AND HE PLANS TO DC LATER TODAY PENDING LABS

## 2019-12-02 NOTE — DISCHARGE SUMMARY
DISCHARGE DIAGNOSES:  

1. Low back pain, musculoskeletal.

2. Pulmonary fibrosis.

3. Reflux disease.

4. Chronic anemia.

 

HISTORY OF PRESENT ILLNESS AND HOSPITAL COURSE:  See also chart for full details.  The

patient is a lady, who presented with worsening lower back pain with no acute injuries,

where she had a workup that showed no new fractures and no new issues.  She was given

some medications with tramadol and Robaxin, which significantly helped her pain

tremendously, so therefore she wanted to be discharged home with medications and

followup.  She was also seen by Pulmonary in the hospital due to her fibrotic changes on

her CT scan, but since she was doing fairly well and asymptomatic, she was discharged

with followup as an outpatient.  Please see also chart for full details. 

 

 

 

 

______________________________

MD JOESPH Botello/YONG

D:  12/01/2019 05:19:54

T:  12/02/2019 01:25:19

Job #:  [id number]

## 2019-12-17 ENCOUNTER — HOSPITAL ENCOUNTER (EMERGENCY)
Dept: HOSPITAL 88 - FSED | Age: 84
LOS: 1 days | Discharge: OTHER INSTITUTION | End: 2019-12-18
Payer: MEDICARE

## 2019-12-17 VITALS — SYSTOLIC BLOOD PRESSURE: 91 MMHG | DIASTOLIC BLOOD PRESSURE: 50 MMHG

## 2019-12-17 VITALS — BODY MASS INDEX: 21.26 KG/M2 | WEIGHT: 120 LBS | HEIGHT: 63 IN

## 2019-12-17 DIAGNOSIS — J45.909: ICD-10-CM

## 2019-12-17 DIAGNOSIS — R50.9: Primary | ICD-10-CM

## 2019-12-17 DIAGNOSIS — D61.3: ICD-10-CM

## 2019-12-17 DIAGNOSIS — J96.00: ICD-10-CM

## 2019-12-17 DIAGNOSIS — R65.20: ICD-10-CM

## 2019-12-17 DIAGNOSIS — R05: ICD-10-CM

## 2019-12-17 DIAGNOSIS — K21.9: ICD-10-CM

## 2019-12-17 DIAGNOSIS — J15.9: ICD-10-CM

## 2019-12-17 DIAGNOSIS — J44.1: ICD-10-CM

## 2019-12-17 PROCEDURE — 81003 URINALYSIS AUTO W/O SCOPE: CPT

## 2019-12-17 PROCEDURE — 90471 IMMUNIZATION ADMIN: CPT

## 2019-12-17 PROCEDURE — 96374 THER/PROPH/DIAG INJ IV PUSH: CPT

## 2019-12-17 PROCEDURE — 84484 ASSAY OF TROPONIN QUANT: CPT

## 2019-12-17 PROCEDURE — 82553 CREATINE MB FRACTION: CPT

## 2019-12-17 PROCEDURE — 80048 BASIC METABOLIC PNL TOTAL CA: CPT

## 2019-12-17 PROCEDURE — 96375 TX/PRO/DX INJ NEW DRUG ADDON: CPT

## 2019-12-17 PROCEDURE — 85025 COMPLETE CBC W/AUTO DIFF WBC: CPT

## 2019-12-17 PROCEDURE — 71046 X-RAY EXAM CHEST 2 VIEWS: CPT

## 2019-12-17 PROCEDURE — 83605 ASSAY OF LACTIC ACID: CPT

## 2019-12-17 PROCEDURE — 80076 HEPATIC FUNCTION PANEL: CPT

## 2019-12-17 PROCEDURE — 93005 ELECTROCARDIOGRAM TRACING: CPT

## 2019-12-17 PROCEDURE — 99284 EMERGENCY DEPT VISIT MOD MDM: CPT

## 2019-12-17 PROCEDURE — 83880 ASSAY OF NATRIURETIC PEPTIDE: CPT

## 2019-12-17 PROCEDURE — 87400 INFLUENZA A/B EACH AG IA: CPT

## 2019-12-17 PROCEDURE — 87040 BLOOD CULTURE FOR BACTERIA: CPT

## 2019-12-17 NOTE — DIAGNOSTIC IMAGING REPORT
EXAMINATION:  CXR 2 VIEW - [facility]    



INDICATION:      

^[id number]

^[id number]



COMPARISON:  11/29/2019

     

FINDINGS:  PA and lateral views



TUBES and LINES:  None.



LUNGS:  Lungs are well inflated.  Biapical scarring. Chronic appearing

increased interstitial lung markings with superimposed mild interstitial edema.



PLEURA:  No pleural effusion or pneumothorax.



HEART AND MEDIASTINUM:  The cardiomediastinal silhouette is enlarged.    



BONES AND SOFT TISSUES: Generalized demineralization. Upper thoracic vertebral

body vertebroplasty again seen.  No acute osseous lesion.  Soft tissues are

unremarkable.



UPPER ABDOMEN: No free air under the diaphragm.    



IMPRESSION: 

Central vascular congestion and mild interstitial edema.

Chronic appearing increased interstitial lung markings, suggestive of fibrotic

changes. Underlying pneumonia cannot be excluded.





Signed by: Dr. Robby Sanders MD on 12/17/2019 9:23 PM

## 2019-12-18 NOTE — NUR
RASHIDA RN @ [facility] CONFIRMED THAT THEY DID RECIEVE THE 
FAXED PAPER WORK AND WOULD PLACE IT IN THE CHART.